# Patient Record
Sex: FEMALE | Race: WHITE | NOT HISPANIC OR LATINO
[De-identification: names, ages, dates, MRNs, and addresses within clinical notes are randomized per-mention and may not be internally consistent; named-entity substitution may affect disease eponyms.]

---

## 2017-01-09 ENCOUNTER — RX RENEWAL (OUTPATIENT)
Age: 82
End: 2017-01-09

## 2017-01-10 ENCOUNTER — APPOINTMENT (OUTPATIENT)
Dept: CARDIOLOGY | Facility: CLINIC | Age: 82
End: 2017-01-10

## 2017-01-10 ENCOUNTER — NON-APPOINTMENT (OUTPATIENT)
Age: 82
End: 2017-01-10

## 2017-01-10 VITALS
BODY MASS INDEX: 34.38 KG/M2 | SYSTOLIC BLOOD PRESSURE: 123 MMHG | OXYGEN SATURATION: 94 % | WEIGHT: 194 LBS | HEIGHT: 63 IN | DIASTOLIC BLOOD PRESSURE: 65 MMHG | HEART RATE: 68 BPM | TEMPERATURE: 98.4 F

## 2017-01-10 VITALS — OXYGEN SATURATION: 98 %

## 2017-01-10 DIAGNOSIS — R07.81 PLEURODYNIA: ICD-10-CM

## 2017-01-11 LAB
ALBUMIN SERPL ELPH-MCNC: 4.1 G/DL
ALP BLD-CCNC: 95 U/L
ALT SERPL-CCNC: 11 U/L
ANION GAP SERPL CALC-SCNC: 14 MMOL/L
AST SERPL-CCNC: 15 U/L
BASOPHILS # BLD AUTO: 0.02 K/UL
BASOPHILS NFR BLD AUTO: 0.3 %
BILIRUB SERPL-MCNC: 0.4 MG/DL
BUN SERPL-MCNC: 19 MG/DL
CALCIUM SERPL-MCNC: 9.4 MG/DL
CHLORIDE SERPL-SCNC: 95 MMOL/L
CHOLEST SERPL-MCNC: 136 MG/DL
CHOLEST/HDLC SERPL: 3.3 RATIO
CO2 SERPL-SCNC: 26 MMOL/L
CREAT SERPL-MCNC: 0.84 MG/DL
EOSINOPHIL # BLD AUTO: 0.03 K/UL
EOSINOPHIL NFR BLD AUTO: 0.5 %
ERYTHROCYTE [SEDIMENTATION RATE] IN BLOOD BY WESTERGREN METHOD: 57 MM/HR
GLUCOSE SERPL-MCNC: 112 MG/DL
HBA1C MFR BLD HPLC: 6.5 %
HCT VFR BLD CALC: 35.3 %
HDLC SERPL-MCNC: 41 MG/DL
HGB BLD-MCNC: 11 G/DL
IMM GRANULOCYTES NFR BLD AUTO: 0.2 %
LDLC SERPL CALC-MCNC: 81 MG/DL
LYMPHOCYTES # BLD AUTO: 1.47 K/UL
LYMPHOCYTES NFR BLD AUTO: 24.9 %
MAN DIFF?: NORMAL
MCHC RBC-ENTMCNC: 27 PG
MCHC RBC-ENTMCNC: 31.2 GM/DL
MCV RBC AUTO: 86.5 FL
MONOCYTES # BLD AUTO: 0.58 K/UL
MONOCYTES NFR BLD AUTO: 9.8 %
NEUTROPHILS # BLD AUTO: 3.8 K/UL
NEUTROPHILS NFR BLD AUTO: 64.3 %
PLATELET # BLD AUTO: 273 K/UL
POTASSIUM SERPL-SCNC: 4.6 MMOL/L
PROT SERPL-MCNC: 6.9 G/DL
RBC # BLD: 4.08 M/UL
RBC # FLD: 14.5 %
SODIUM SERPL-SCNC: 135 MMOL/L
TRIGL SERPL-MCNC: 72 MG/DL
VIT B12 SERPL-MCNC: 304 PG/ML
WBC # FLD AUTO: 5.91 K/UL

## 2017-05-08 ENCOUNTER — RX RENEWAL (OUTPATIENT)
Age: 82
End: 2017-05-08

## 2017-05-15 ENCOUNTER — APPOINTMENT (OUTPATIENT)
Dept: SURGICAL ONCOLOGY | Facility: CLINIC | Age: 82
End: 2017-05-15

## 2017-05-15 VITALS — HEART RATE: 68 BPM | BODY MASS INDEX: 33.66 KG/M2 | OXYGEN SATURATION: 100 % | WEIGHT: 190 LBS | HEIGHT: 63 IN

## 2017-05-15 DIAGNOSIS — Z97.3 PRESENCE OF SPECTACLES AND CONTACT LENSES: ICD-10-CM

## 2017-05-15 RX ORDER — CRANBERRY FRUIT EXTRACT 200 MG
CAPSULE ORAL
Refills: 0 | Status: ACTIVE | COMMUNITY

## 2017-05-15 RX ORDER — MULTIVIT-MIN/IRON/FOLIC ACID/K 18-600-40
CAPSULE ORAL
Refills: 0 | Status: ACTIVE | COMMUNITY

## 2017-05-16 ENCOUNTER — APPOINTMENT (OUTPATIENT)
Dept: CARDIOLOGY | Facility: CLINIC | Age: 82
End: 2017-05-16

## 2017-05-16 ENCOUNTER — NON-APPOINTMENT (OUTPATIENT)
Age: 82
End: 2017-05-16

## 2017-05-16 VITALS
WEIGHT: 199 LBS | HEIGHT: 63 IN | HEART RATE: 71 BPM | BODY MASS INDEX: 35.26 KG/M2 | SYSTOLIC BLOOD PRESSURE: 136 MMHG | OXYGEN SATURATION: 97 % | DIASTOLIC BLOOD PRESSURE: 72 MMHG

## 2017-05-16 DIAGNOSIS — D64.9 ANEMIA, UNSPECIFIED: ICD-10-CM

## 2017-05-17 LAB
ANION GAP SERPL CALC-SCNC: 15 MMOL/L
BASOPHILS # BLD AUTO: 0.02 K/UL
BASOPHILS NFR BLD AUTO: 0.4 %
BUN SERPL-MCNC: 18 MG/DL
CALCIUM SERPL-MCNC: 9.5 MG/DL
CHLORIDE SERPL-SCNC: 100 MMOL/L
CHOLEST SERPL-MCNC: 142 MG/DL
CHOLEST/HDLC SERPL: 2.7 RATIO
CK SERPL-CCNC: 70 U/L
CO2 SERPL-SCNC: 23 MMOL/L
CREAT SERPL-MCNC: 0.78 MG/DL
EOSINOPHIL # BLD AUTO: 0.06 K/UL
EOSINOPHIL NFR BLD AUTO: 1.2 %
GLUCOSE SERPL-MCNC: 95 MG/DL
HBA1C MFR BLD HPLC: 6.1 %
HCT VFR BLD CALC: 35 %
HDLC SERPL-MCNC: 52 MG/DL
HGB BLD-MCNC: 10.9 G/DL
IMM GRANULOCYTES NFR BLD AUTO: 0.2 %
LDLC SERPL CALC-MCNC: 74 MG/DL
LYMPHOCYTES # BLD AUTO: 1.66 K/UL
LYMPHOCYTES NFR BLD AUTO: 32.7 %
MAN DIFF?: NORMAL
MCHC RBC-ENTMCNC: 26.6 PG
MCHC RBC-ENTMCNC: 31.1 GM/DL
MCV RBC AUTO: 85.4 FL
MONOCYTES # BLD AUTO: 0.53 K/UL
MONOCYTES NFR BLD AUTO: 10.4 %
NEUTROPHILS # BLD AUTO: 2.8 K/UL
NEUTROPHILS NFR BLD AUTO: 55.1 %
NT-PROBNP SERPL-MCNC: 193 PG/ML
PLATELET # BLD AUTO: 226 K/UL
POTASSIUM SERPL-SCNC: 4.6 MMOL/L
RBC # BLD: 4.1 M/UL
RBC # FLD: 16.1 %
SODIUM SERPL-SCNC: 138 MMOL/L
TRIGL SERPL-MCNC: 80 MG/DL
TSH SERPL-ACNC: 2.17 UIU/ML
VIT B12 SERPL-MCNC: 329 PG/ML
WBC # FLD AUTO: 5.08 K/UL

## 2017-08-07 ENCOUNTER — RX RENEWAL (OUTPATIENT)
Age: 82
End: 2017-08-07

## 2017-08-10 ENCOUNTER — RX RENEWAL (OUTPATIENT)
Age: 82
End: 2017-08-10

## 2017-09-12 ENCOUNTER — APPOINTMENT (OUTPATIENT)
Dept: CARDIOLOGY | Facility: CLINIC | Age: 82
End: 2017-09-12
Payer: MEDICARE

## 2017-09-12 ENCOUNTER — NON-APPOINTMENT (OUTPATIENT)
Age: 82
End: 2017-09-12

## 2017-09-12 VITALS
SYSTOLIC BLOOD PRESSURE: 111 MMHG | WEIGHT: 202 LBS | DIASTOLIC BLOOD PRESSURE: 69 MMHG | OXYGEN SATURATION: 96 % | HEART RATE: 63 BPM | TEMPERATURE: 97.6 F | BODY MASS INDEX: 35.79 KG/M2 | HEIGHT: 63 IN

## 2017-09-12 PROCEDURE — 36415 COLL VENOUS BLD VENIPUNCTURE: CPT

## 2017-09-12 PROCEDURE — 93000 ELECTROCARDIOGRAM COMPLETE: CPT

## 2017-09-12 PROCEDURE — 99214 OFFICE O/P EST MOD 30 MIN: CPT

## 2017-09-13 LAB
ALBUMIN SERPL ELPH-MCNC: 4 G/DL
ALP BLD-CCNC: 71 U/L
ALT SERPL-CCNC: 9 U/L
ANION GAP SERPL CALC-SCNC: 16 MMOL/L
AST SERPL-CCNC: 17 U/L
BASOPHILS # BLD AUTO: 0.02 K/UL
BASOPHILS NFR BLD AUTO: 0.4 %
BILIRUB SERPL-MCNC: 0.3 MG/DL
BUN SERPL-MCNC: 25 MG/DL
CALCIUM SERPL-MCNC: 10 MG/DL
CHLORIDE SERPL-SCNC: 93 MMOL/L
CHOLEST SERPL-MCNC: 142 MG/DL
CHOLEST/HDLC SERPL: 3.1 RATIO
CO2 SERPL-SCNC: 23 MMOL/L
CREAT SERPL-MCNC: 0.78 MG/DL
EOSINOPHIL # BLD AUTO: 0.07 K/UL
EOSINOPHIL NFR BLD AUTO: 1.5 %
GLUCOSE SERPL-MCNC: 94 MG/DL
HBA1C MFR BLD HPLC: 6 %
HCT VFR BLD CALC: 35 %
HDLC SERPL-MCNC: 46 MG/DL
HGB BLD-MCNC: 11.1 G/DL
IMM GRANULOCYTES NFR BLD AUTO: 0 %
LDLC SERPL CALC-MCNC: 76 MG/DL
LYMPHOCYTES # BLD AUTO: 1.68 K/UL
LYMPHOCYTES NFR BLD AUTO: 37.2 %
MAN DIFF?: NORMAL
MCHC RBC-ENTMCNC: 26.1 PG
MCHC RBC-ENTMCNC: 31.7 GM/DL
MCV RBC AUTO: 82.2 FL
MONOCYTES # BLD AUTO: 0.49 K/UL
MONOCYTES NFR BLD AUTO: 10.8 %
NEUTROPHILS # BLD AUTO: 2.26 K/UL
NEUTROPHILS NFR BLD AUTO: 50.1 %
NT-PROBNP SERPL-MCNC: 180 PG/ML
PLATELET # BLD AUTO: 244 K/UL
POTASSIUM SERPL-SCNC: 4.2 MMOL/L
PROT SERPL-MCNC: 7.1 G/DL
RBC # BLD: 4.26 M/UL
RBC # FLD: 15.3 %
SODIUM SERPL-SCNC: 132 MMOL/L
TRIGL SERPL-MCNC: 98 MG/DL
WBC # FLD AUTO: 4.52 K/UL

## 2017-09-25 ENCOUNTER — APPOINTMENT (OUTPATIENT)
Dept: SURGICAL ONCOLOGY | Facility: CLINIC | Age: 82
End: 2017-09-25
Payer: MEDICARE

## 2017-09-25 VITALS
BODY MASS INDEX: 35.44 KG/M2 | SYSTOLIC BLOOD PRESSURE: 113 MMHG | RESPIRATION RATE: 15 BRPM | WEIGHT: 200 LBS | DIASTOLIC BLOOD PRESSURE: 66 MMHG | OXYGEN SATURATION: 95 % | HEART RATE: 68 BPM | HEIGHT: 63 IN

## 2017-09-25 PROCEDURE — 99214 OFFICE O/P EST MOD 30 MIN: CPT

## 2017-10-02 ENCOUNTER — APPOINTMENT (OUTPATIENT)
Dept: SURGICAL ONCOLOGY | Facility: CLINIC | Age: 82
End: 2017-10-02

## 2017-11-08 ENCOUNTER — RX RENEWAL (OUTPATIENT)
Age: 82
End: 2017-11-08

## 2017-11-17 ENCOUNTER — RX RENEWAL (OUTPATIENT)
Age: 82
End: 2017-11-17

## 2017-12-04 ENCOUNTER — RX RENEWAL (OUTPATIENT)
Age: 82
End: 2017-12-04

## 2017-12-08 ENCOUNTER — CHART COPY (OUTPATIENT)
Age: 82
End: 2017-12-08

## 2018-01-09 ENCOUNTER — NON-APPOINTMENT (OUTPATIENT)
Age: 83
End: 2018-01-09

## 2018-01-09 ENCOUNTER — APPOINTMENT (OUTPATIENT)
Dept: CARDIOLOGY | Facility: CLINIC | Age: 83
End: 2018-01-09
Payer: MEDICARE

## 2018-01-09 VITALS
SYSTOLIC BLOOD PRESSURE: 143 MMHG | WEIGHT: 198 LBS | DIASTOLIC BLOOD PRESSURE: 77 MMHG | HEIGHT: 63 IN | HEART RATE: 57 BPM | BODY MASS INDEX: 35.08 KG/M2 | OXYGEN SATURATION: 96 %

## 2018-01-09 VITALS — DIASTOLIC BLOOD PRESSURE: 70 MMHG | HEART RATE: 60 BPM | SYSTOLIC BLOOD PRESSURE: 124 MMHG

## 2018-01-09 PROCEDURE — 93000 ELECTROCARDIOGRAM COMPLETE: CPT

## 2018-01-09 PROCEDURE — 36415 COLL VENOUS BLD VENIPUNCTURE: CPT

## 2018-01-09 PROCEDURE — 99214 OFFICE O/P EST MOD 30 MIN: CPT

## 2018-01-10 ENCOUNTER — NON-APPOINTMENT (OUTPATIENT)
Age: 83
End: 2018-01-10

## 2018-01-10 LAB
ALBUMIN SERPL ELPH-MCNC: 4.2 G/DL
ALP BLD-CCNC: 80 U/L
ALT SERPL-CCNC: 10 U/L
ANION GAP SERPL CALC-SCNC: 14 MMOL/L
AST SERPL-CCNC: 17 U/L
BASOPHILS # BLD AUTO: 0.03 K/UL
BASOPHILS NFR BLD AUTO: 0.5 %
BILIRUB SERPL-MCNC: 0.3 MG/DL
BUN SERPL-MCNC: 20 MG/DL
CALCIUM SERPL-MCNC: 9.9 MG/DL
CHLORIDE SERPL-SCNC: 97 MMOL/L
CHOLEST SERPL-MCNC: 142 MG/DL
CHOLEST/HDLC SERPL: 3 RATIO
CO2 SERPL-SCNC: 26 MMOL/L
CREAT SERPL-MCNC: 0.89 MG/DL
EOSINOPHIL # BLD AUTO: 0.05 K/UL
EOSINOPHIL NFR BLD AUTO: 0.8 %
GLUCOSE SERPL-MCNC: 90 MG/DL
HBA1C MFR BLD HPLC: 6 %
HCT VFR BLD CALC: 36.7 %
HDLC SERPL-MCNC: 47 MG/DL
HGB BLD-MCNC: 11.4 G/DL
IMM GRANULOCYTES NFR BLD AUTO: 0.2 %
LDLC SERPL CALC-MCNC: 75 MG/DL
LYMPHOCYTES # BLD AUTO: 1.53 K/UL
LYMPHOCYTES NFR BLD AUTO: 25.8 %
MAN DIFF?: NORMAL
MCHC RBC-ENTMCNC: 27 PG
MCHC RBC-ENTMCNC: 31.1 GM/DL
MCV RBC AUTO: 86.8 FL
MONOCYTES # BLD AUTO: 0.61 K/UL
MONOCYTES NFR BLD AUTO: 10.3 %
NEUTROPHILS # BLD AUTO: 3.7 K/UL
NEUTROPHILS NFR BLD AUTO: 62.4 %
NT-PROBNP SERPL-MCNC: 262 PG/ML
PLATELET # BLD AUTO: 245 K/UL
POTASSIUM SERPL-SCNC: 4.7 MMOL/L
PROT SERPL-MCNC: 6.7 G/DL
RBC # BLD: 4.23 M/UL
RBC # FLD: 15.8 %
SODIUM SERPL-SCNC: 137 MMOL/L
TRIGL SERPL-MCNC: 102 MG/DL
WBC # FLD AUTO: 5.93 K/UL

## 2018-01-11 LAB — TSH SERPL-ACNC: 3.41 UIU/ML

## 2018-02-05 ENCOUNTER — RX RENEWAL (OUTPATIENT)
Age: 83
End: 2018-02-05

## 2018-02-08 ENCOUNTER — RX RENEWAL (OUTPATIENT)
Age: 83
End: 2018-02-08

## 2018-04-02 ENCOUNTER — APPOINTMENT (OUTPATIENT)
Dept: SURGICAL ONCOLOGY | Facility: CLINIC | Age: 83
End: 2018-04-02
Payer: MEDICARE

## 2018-05-07 ENCOUNTER — APPOINTMENT (OUTPATIENT)
Dept: SURGICAL ONCOLOGY | Facility: CLINIC | Age: 83
End: 2018-05-07
Payer: MEDICARE

## 2018-05-07 VITALS
DIASTOLIC BLOOD PRESSURE: 72 MMHG | SYSTOLIC BLOOD PRESSURE: 136 MMHG | HEART RATE: 62 BPM | WEIGHT: 200 LBS | HEIGHT: 63 IN | BODY MASS INDEX: 35.44 KG/M2 | RESPIRATION RATE: 17 BRPM

## 2018-05-07 PROCEDURE — 99214 OFFICE O/P EST MOD 30 MIN: CPT

## 2018-05-08 ENCOUNTER — NON-APPOINTMENT (OUTPATIENT)
Age: 83
End: 2018-05-08

## 2018-05-08 ENCOUNTER — APPOINTMENT (OUTPATIENT)
Dept: CARDIOLOGY | Facility: CLINIC | Age: 83
End: 2018-05-08
Payer: MEDICARE

## 2018-05-08 VITALS
BODY MASS INDEX: 36.14 KG/M2 | WEIGHT: 204 LBS | DIASTOLIC BLOOD PRESSURE: 69 MMHG | HEART RATE: 58 BPM | SYSTOLIC BLOOD PRESSURE: 119 MMHG | HEIGHT: 63 IN | OXYGEN SATURATION: 96 % | TEMPERATURE: 98.2 F

## 2018-05-08 DIAGNOSIS — E53.8 DEFICIENCY OF OTHER SPECIFIED B GROUP VITAMINS: ICD-10-CM

## 2018-05-08 DIAGNOSIS — E87.1 HYPO-OSMOLALITY AND HYPONATREMIA: ICD-10-CM

## 2018-05-08 PROCEDURE — 93000 ELECTROCARDIOGRAM COMPLETE: CPT

## 2018-05-08 PROCEDURE — 99214 OFFICE O/P EST MOD 30 MIN: CPT

## 2018-05-08 PROCEDURE — 36415 COLL VENOUS BLD VENIPUNCTURE: CPT

## 2018-05-10 LAB
ALBUMIN SERPL ELPH-MCNC: 4 G/DL
ALP BLD-CCNC: 73 U/L
ALT SERPL-CCNC: 10 U/L
ANION GAP SERPL CALC-SCNC: 16 MMOL/L
AST SERPL-CCNC: 15 U/L
BASOPHILS # BLD AUTO: 0.02 K/UL
BASOPHILS NFR BLD AUTO: 0.3 %
BILIRUB SERPL-MCNC: 0.4 MG/DL
BUN SERPL-MCNC: 23 MG/DL
CALCIUM SERPL-MCNC: 9.9 MG/DL
CHLORIDE SERPL-SCNC: 96 MMOL/L
CHOLEST SERPL-MCNC: 138 MG/DL
CHOLEST/HDLC SERPL: 2.9 RATIO
CO2 SERPL-SCNC: 24 MMOL/L
CREAT SERPL-MCNC: 0.86 MG/DL
EOSINOPHIL # BLD AUTO: 0.05 K/UL
EOSINOPHIL NFR BLD AUTO: 0.9 %
GLUCOSE SERPL-MCNC: 94 MG/DL
HBA1C MFR BLD HPLC: 6.4 %
HCT VFR BLD CALC: 35.4 %
HDLC SERPL-MCNC: 47 MG/DL
HGB BLD-MCNC: 11.3 G/DL
IMM GRANULOCYTES NFR BLD AUTO: 0.7 %
LDLC SERPL CALC-MCNC: 74 MG/DL
LYMPHOCYTES # BLD AUTO: 1.65 K/UL
LYMPHOCYTES NFR BLD AUTO: 28.6 %
MAN DIFF?: NORMAL
MCHC RBC-ENTMCNC: 26.9 PG
MCHC RBC-ENTMCNC: 31.9 GM/DL
MCV RBC AUTO: 84.3 FL
MONOCYTES # BLD AUTO: 0.58 K/UL
MONOCYTES NFR BLD AUTO: 10.1 %
NEUTROPHILS # BLD AUTO: 3.43 K/UL
NEUTROPHILS NFR BLD AUTO: 59.4 %
NT-PROBNP SERPL-MCNC: 216 PG/ML
PLATELET # BLD AUTO: 233 K/UL
POTASSIUM SERPL-SCNC: 4.5 MMOL/L
PROT SERPL-MCNC: 6.6 G/DL
RBC # BLD: 4.2 M/UL
RBC # FLD: 15.5 %
SODIUM SERPL-SCNC: 136 MMOL/L
TRIGL SERPL-MCNC: 87 MG/DL
WBC # FLD AUTO: 5.77 K/UL

## 2018-05-11 LAB
TSH SERPL-ACNC: 3.22 UIU/ML
VIT B12 SERPL-MCNC: 709 PG/ML

## 2018-05-13 ENCOUNTER — RX RENEWAL (OUTPATIENT)
Age: 83
End: 2018-05-13

## 2018-05-14 ENCOUNTER — APPOINTMENT (OUTPATIENT)
Dept: CARDIOLOGY | Facility: CLINIC | Age: 83
End: 2018-05-14
Payer: MEDICARE

## 2018-05-14 PROCEDURE — 93306 TTE W/DOPPLER COMPLETE: CPT

## 2018-08-15 ENCOUNTER — RX RENEWAL (OUTPATIENT)
Age: 83
End: 2018-08-15

## 2018-09-03 ENCOUNTER — RX RENEWAL (OUTPATIENT)
Age: 83
End: 2018-09-03

## 2018-09-13 ENCOUNTER — APPOINTMENT (OUTPATIENT)
Dept: CARDIOLOGY | Facility: CLINIC | Age: 83
End: 2018-09-13
Payer: MEDICARE

## 2018-09-13 ENCOUNTER — NON-APPOINTMENT (OUTPATIENT)
Age: 83
End: 2018-09-13

## 2018-09-13 VITALS
BODY MASS INDEX: 36.32 KG/M2 | SYSTOLIC BLOOD PRESSURE: 116 MMHG | HEART RATE: 68 BPM | OXYGEN SATURATION: 95 % | DIASTOLIC BLOOD PRESSURE: 76 MMHG | HEIGHT: 63 IN | TEMPERATURE: 98.1 F | WEIGHT: 205 LBS

## 2018-09-13 PROCEDURE — 93000 ELECTROCARDIOGRAM COMPLETE: CPT

## 2018-09-13 PROCEDURE — 36415 COLL VENOUS BLD VENIPUNCTURE: CPT

## 2018-09-13 PROCEDURE — 99214 OFFICE O/P EST MOD 30 MIN: CPT

## 2018-09-14 LAB
ALBUMIN SERPL ELPH-MCNC: 4 G/DL
ALP BLD-CCNC: 75 U/L
ALT SERPL-CCNC: 9 U/L
ANION GAP SERPL CALC-SCNC: 15 MMOL/L
AST SERPL-CCNC: 16 U/L
BASOPHILS # BLD AUTO: 0.02 K/UL
BASOPHILS NFR BLD AUTO: 0.4 %
BILIRUB SERPL-MCNC: 0.3 MG/DL
BUN SERPL-MCNC: 21 MG/DL
CALCIUM SERPL-MCNC: 9.9 MG/DL
CHLORIDE SERPL-SCNC: 97 MMOL/L
CHOLEST SERPL-MCNC: 130 MG/DL
CHOLEST/HDLC SERPL: 3.1 RATIO
CO2 SERPL-SCNC: 23 MMOL/L
CREAT SERPL-MCNC: 0.8 MG/DL
EOSINOPHIL # BLD AUTO: 0.05 K/UL
EOSINOPHIL NFR BLD AUTO: 1 %
GLUCOSE SERPL-MCNC: 94 MG/DL
HBA1C MFR BLD HPLC: 6.4 %
HCT VFR BLD CALC: 35.4 %
HDLC SERPL-MCNC: 42 MG/DL
HGB BLD-MCNC: 11.6 G/DL
IMM GRANULOCYTES NFR BLD AUTO: 0.4 %
LDLC SERPL CALC-MCNC: 62 MG/DL
LYMPHOCYTES # BLD AUTO: 1.51 K/UL
LYMPHOCYTES NFR BLD AUTO: 29.8 %
MAN DIFF?: NORMAL
MCHC RBC-ENTMCNC: 27.2 PG
MCHC RBC-ENTMCNC: 32.8 GM/DL
MCV RBC AUTO: 82.9 FL
MONOCYTES # BLD AUTO: 0.53 K/UL
MONOCYTES NFR BLD AUTO: 10.5 %
NEUTROPHILS # BLD AUTO: 2.94 K/UL
NEUTROPHILS NFR BLD AUTO: 57.9 %
NT-PROBNP SERPL-MCNC: 184 PG/ML
PLATELET # BLD AUTO: 211 K/UL
POTASSIUM SERPL-SCNC: 4.2 MMOL/L
PROT SERPL-MCNC: 7 G/DL
RBC # BLD: 4.27 M/UL
RBC # FLD: 16 %
SODIUM SERPL-SCNC: 135 MMOL/L
TRIGL SERPL-MCNC: 129 MG/DL
TSH SERPL-ACNC: 2.91 UIU/ML
VIT B12 SERPL-MCNC: 370 PG/ML
WBC # FLD AUTO: 5.07 K/UL

## 2018-10-19 ENCOUNTER — CLINICAL ADVICE (OUTPATIENT)
Age: 83
End: 2018-10-19

## 2018-10-24 ENCOUNTER — CLINICAL ADVICE (OUTPATIENT)
Age: 83
End: 2018-10-24

## 2018-10-29 ENCOUNTER — APPOINTMENT (OUTPATIENT)
Dept: CARDIOLOGY | Facility: CLINIC | Age: 83
End: 2018-10-29
Payer: MEDICARE

## 2018-10-29 ENCOUNTER — NON-APPOINTMENT (OUTPATIENT)
Age: 83
End: 2018-10-29

## 2018-10-29 ENCOUNTER — MEDICATION RENEWAL (OUTPATIENT)
Age: 83
End: 2018-10-29

## 2018-10-29 VITALS
OXYGEN SATURATION: 94 % | DIASTOLIC BLOOD PRESSURE: 85 MMHG | HEART RATE: 99 BPM | HEIGHT: 63 IN | BODY MASS INDEX: 35.61 KG/M2 | TEMPERATURE: 98.1 F | WEIGHT: 201 LBS | SYSTOLIC BLOOD PRESSURE: 133 MMHG

## 2018-10-29 VITALS — SYSTOLIC BLOOD PRESSURE: 138 MMHG | HEART RATE: 84 BPM | DIASTOLIC BLOOD PRESSURE: 70 MMHG

## 2018-10-29 PROCEDURE — 93000 ELECTROCARDIOGRAM COMPLETE: CPT

## 2018-10-29 PROCEDURE — 99215 OFFICE O/P EST HI 40 MIN: CPT

## 2018-11-05 ENCOUNTER — APPOINTMENT (OUTPATIENT)
Dept: SURGICAL ONCOLOGY | Facility: CLINIC | Age: 83
End: 2018-11-05
Payer: MEDICARE

## 2018-11-05 VITALS
DIASTOLIC BLOOD PRESSURE: 71 MMHG | WEIGHT: 201 LBS | HEART RATE: 53 BPM | RESPIRATION RATE: 18 BRPM | SYSTOLIC BLOOD PRESSURE: 141 MMHG | HEIGHT: 63 IN | BODY MASS INDEX: 35.61 KG/M2

## 2018-11-05 PROCEDURE — 99214 OFFICE O/P EST MOD 30 MIN: CPT

## 2018-11-08 ENCOUNTER — MEDICATION RENEWAL (OUTPATIENT)
Age: 83
End: 2018-11-08

## 2018-11-15 ENCOUNTER — RX RENEWAL (OUTPATIENT)
Age: 83
End: 2018-11-15

## 2018-11-19 ENCOUNTER — NON-APPOINTMENT (OUTPATIENT)
Age: 83
End: 2018-11-19

## 2018-11-19 ENCOUNTER — APPOINTMENT (OUTPATIENT)
Dept: CARDIOLOGY | Facility: CLINIC | Age: 83
End: 2018-11-19
Payer: MEDICARE

## 2018-11-19 VITALS
TEMPERATURE: 98.1 F | DIASTOLIC BLOOD PRESSURE: 73 MMHG | OXYGEN SATURATION: 97 % | BODY MASS INDEX: 34.91 KG/M2 | SYSTOLIC BLOOD PRESSURE: 117 MMHG | HEIGHT: 63 IN | HEART RATE: 62 BPM | WEIGHT: 197 LBS

## 2018-11-19 DIAGNOSIS — K52.9 NONINFECTIVE GASTROENTERITIS AND COLITIS, UNSPECIFIED: ICD-10-CM

## 2018-11-19 PROCEDURE — 93000 ELECTROCARDIOGRAM COMPLETE: CPT

## 2018-11-19 PROCEDURE — 36415 COLL VENOUS BLD VENIPUNCTURE: CPT

## 2018-11-19 PROCEDURE — 99215 OFFICE O/P EST HI 40 MIN: CPT

## 2018-11-19 NOTE — REVIEW OF SYSTEMS
[Palpitations] : palpitations [Abdominal Pain] : abdominal pain [Joint Pain] : joint pain [Joint Stiffness] : joint stiffness [see HPI] : see HPI [Numbness (Hypesthesia)] : numbness [Under Stress] : under stress [Negative] : Heme/Lymph [Recent Weight Gain (___ Lbs)] : no recent weight gain [Feeling Fatigued] : not feeling fatigued [Recent Weight Loss (___ Lbs)] : no recent weight loss [Shortness Of Breath] : no shortness of breath [Dyspnea on exertion] : not dyspnea during exertion [Chest  Pressure] : no chest pressure [Chest Pain] : no chest pain [Lower Ext Edema] : no extremity edema [Leg Claudication] : no intermittent leg claudication [Heartburn] : no heartburn [Change In The Stool] : no change in stool [Dizziness] : no dizziness

## 2018-11-19 NOTE — HISTORY OF PRESENT ILLNESS
[FreeTextEntry1] : (MED HX) Patient with known coronary disease with stents to her circumflex artery and obtuse marginal going back to 2006 and 2008. Normal stress thallium in 2011 before surgery. Symptoms of esophageal reflux on and off that have been different than her angina. However in August, 2012 she had a return of her usual angina which initially responded to bisoprolol but then her symptoms increased and the patient called me on September 7. She was scheduled for a cardiac catheterization on September 10 and found to have a subtotal occlusion of her circumflex. This was treated with a drug-eluting stent and she was sent home on the 11th on aspirin and Plavix and she came  for followup 9/28/12. Since being home she has not had any of those same symptoms and is able to walk up the stairs now without shortness of breath or chest pain.\par She was here in January, 2013 for clearance for cataract surgery. She had no complications and was doing well since then other than having trouble maintaining her weight as she goes to [employer] and her  is able to eat cake and other foods without gaining any weight. She denies chest pain or shortness of breath with exertion, although when she lies down in bed she says she feels short of breath briefly.\par August 19, 2013. Here for followup and has no real complaints. She did have some pains in the legs that have been getting better with physical therapy. She denies exertional chest pain or shortness of breath. She is still having trouble losing weight. All seemed acceptable. She had labs with Dr. Do in October. BUN was slightly elevated and the sodium was 131 so he was asking me about cutting back on the diuretic. In addition she was anemic with an MCV of 80.\par January 15, 2014. The patient is here for followup. Her main issue is that she may need carpal tunnel surgery on both sides. She has some atypical pain in the upper left chest and neck area that is nonexertional and sounds more arthritic or musculoskeletal. She denies chest pain or shortness of breath. She was pleased to find out that based on the scale here she has lost 9 pounds.\par Tatiana 10, 2014. The patient is here for followup. She didn't need the carpal tunnel surgery but instead responded well to a steroid injection. However she developed bursitis in her right shoulder and had a steroid shot that just lasted for about a month. Now she is finishing up with physical therapy. Her main complaint is that she just cannot walk like she used to; she just kind of hits a wall and has to slow down. She denies exertional chest pain or shortness of breath and no definite leg pain but thinks it could be her arthritis. She claims her weight is about the same. She has had no change in her medications recently.\par The patient saw Dr. Geronimo in September for preop evaluation and she underwent carpal tunnel surgery on October 2. No complications and she saw Dr. Jaylan Christianson in followup.\par November 14, 2014. The patient is here in followup. When she lies down at night she gets pressure in her chest which feels similar to her previous symptoms and she was worried she might need another stent however she tries to see what happens when she walks up and down the stairs and has absolutely no symptoms whatsoever. Upon further questioning it seems that she was told to go back on the aspirin in addition to the Plavix by the presurgical testing people before the carpal tunnel surgery and it is only since being back on the aspirin that she has these symptoms. There is no shortness of breath. She is having trouble losing weight. I felt fairly certain that her symptoms were reflux and related to the aspirin. The aspirin was stopped and the AcipHex was increased to b.i.d.\par March 23, 2015. Patient is here for followup. On the whole she has been doing well since her last visit without recurrence of her chest pain or shortness of breath. However about 2-3 months ago she had a syncopal episode when she got up from the table and before she realized it she was on the floor. When she came to she was a little shaky but otherwise was okay. No recurrence. The next day she was in pain in her joints and was worried about her knee so  she was checked out by orthopedics and everything was fine. She has had no orthostatic symptoms since then and no lightheadedness or near syncope since then. She is on a diuretic every other day and she does not recall being ill around that time or any reason why she might have been dehydrated. She is on 5 mg of bisoprolol but has had no other episodes of syncope or near syncope in the last 2 months. Workup was unremarkable\par July 29, 2015. The patient is here in followup. She is complaining that her legs and arms feel heavy as if she's walking in sand or water. She is borderline hypotensive and it could just be because of the heat and volume depletion but the patient has not lost any weight and she claims that the symptoms have gone on for a few months not just recently when it's been hot. She denies chest pain or exertional shortness of breath. She has been having some trouble sleeping as well. Her EKG is totally within normal limits. I elected to hold her valsartan and then check labs for kidney function looking for volume depletion as to whether we should cut back on the diuretic more than just every other day. The possibility includes whether these are side effects of statin. No recurrence of her syncope. \par November 12, 2015. The patient is here for followup. She is now off Diovan because of her orthostatic symptoms and these seemed to have improved. She has no specific complaints. She is asking about aspirin versus Plavix, and she has remained just on Plavix most of this time. She does have esophageal reflux and is under treatment and under control. She saw Dr. Olmos in followup for her breast cancer and everything is fine and she is to see him again in 6 months. She denies exertional chest pain or shortness of breath. Her weight is still an issue.\par March 9, 2016. The patient is here for followup. Has been under a fair amount of stress as well as increased physical activity around the house with her 's intracerebral hemorrhage. He is improving greatly which has lessened her stress somewhat. She does not complain of chest pain or shortness of breath. She has an upcoming followup with oncologist and that'll be four years so far since her cancer surgery.\par June 16, 2016. Patient is here for followup with a little stress as her  is doing a little better, but she's been having episodes of trouble with her balance. No vertigo. No lightheadedness, syncope, or near syncope. She can't even feel unbalanced while sitting. Usually can last an hour but can function somewhat. Happens about once a week. Does not seem to be related to food or medication.\par October 13, 2016. Patient is here to follow up with her . They celebrated their 65th wedding anniversary. She is doing about the same, with some stress. Had a workup fpr vertebrobasilar insufficiency. She has numbness in both feet, but not in her hands and she will be having an EMG coming up soon.\par January 10, 2017. Patient here for followup. Not feeling well for about a month or so. Has chest pain and back pain when she takes a deep breath, but not with exertion. Needs laser surgery on her eyes complains about her body from feet to her head etc. Never did start the B12 injections that Dr. Rosenstein wanted. She is worried she might need another stent but again no exertional component.\par May 16, 2017. Patient here in followup. No chest pain or shortness of breath. Tripped on the carpet and got an infection in the knee, where she had her knee replacement, but she was able to treated with oral antibiotics and is just about finished. Her weight is up she thinks. She is very concerned about her , who seems to be a little bit more unbalanced and has a little bit more memory difficulties secondary to his intracerebral hemorrhage surgery and obviously getting older as well. No change in any of her medication.\par September 12, 2017. Patient is here in followup. She has been diagnosed with a peripheral neuropathy and is getting B12 injections. Her legs get numb when she flexes them up towards herself. Meanwhile, she is not walking much, so she is gaining a lot of weight. No chest pain, no shortness of breath, but is not as active as she used to be.\par January 9, 2018. The patient is here in followup. She had a six-month breast cancer followup in September, and everything checked out okay. She denies any increase in exertional chest pain or shortness of breath. There is a fair amount of stress from her 's memory and her 22-year-old granddaughter who is living with them. No change in medications.\par May 8, 2018. Patient here in followup. Doing well except gaining weight, not losing. Hed CAT scan showing coronary artery calcifications, and she was very concerned about the report, but I explained it is a normal finding. At this age and does not necessarily correlate with coronary artery disease. The report also mentioned enlarged heart and mitral valve calcification. No recent echo. Here her blood pressure is excellent. EKG unchanged.\par May 14, 2018. Returned for echocardiogram. Moderate MR. Mildly calcified aortic valve. Mild LAE. Normal LV function with concentric remodeling and mild diastolic dysfunction. Mild SYDNEE, with normal RV size and function. Moderate to severe TR with RVSP 44.\par September 13, 2018. Patient here to followup with her . No real complaints. Still, dyspnea on exertion going up stairs, but no chest pressure, and no change. VPCs on EKG, and on exam. Asymptomatic.\par October 29, 2018. On the 19th patient went to GI for colitis type complaints and was found to be in rapid A. fib. Sent to ER at [facility]. Converted with Cardizem and was started on Xarelto and discharged. I spoke with her on the 24th and arranged to see her today. In the meantime over the weekend she was readmitted to [facility] because of palpitations although not clear that she was in atrial fibrillation. She is here today in sinus rhythm with very frequent APCs. Quadrigeminy. For some reason she stopped her other medications, including her beta blocker her diuretic her statin and Plavix. She is also on metronidazole and Cipro because of her colitis.\par November 19, 2018. Patient is here in followup and for a clearance for a sigmoid biopsy and colonoscopy by Dr. Jonny Connor. The patient remains in sinus rhythm on sotalol. She is asymptomatic. She is somewhat nervous about the anesthesia and procedure. It is scheduled for November 29.She had been on 15 mg of Xarelto from the hospital, but based on her kidney function should be on 20 mg. If she stops the anticoagulation she does need to take baby aspirin because of her cardiac stent

## 2018-11-19 NOTE — REASON FOR VISIT
[FreeTextEntry1] : Followup for patient with cardiac stents and risk factors for CAD, now with paroxysmal atrial fib

## 2018-11-19 NOTE — PHYSICAL EXAM
[General Appearance - Well Developed] : well developed [Normal Appearance] : normal appearance [Well Groomed] : well groomed [General Appearance - Well Nourished] : well nourished [No Deformities] : no deformities [General Appearance - In No Acute Distress] : no acute distress [Normal Conjunctiva] : the conjunctiva exhibited no abnormalities [Eyelids - No Xanthelasma] : the eyelids demonstrated no xanthelasmas [Normal Oral Mucosa] : normal oral mucosa [No Oral Pallor] : no oral pallor [No Oral Cyanosis] : no oral cyanosis [Normal Jugular Venous A Waves Present] : normal jugular venous A waves present [Normal Jugular Venous V Waves Present] : normal jugular venous V waves present [No Jugular Venous Epps A Waves] : no jugular venous epps A waves [Respiration, Rhythm And Depth] : normal respiratory rhythm and effort [Exaggerated Use Of Accessory Muscles For Inspiration] : no accessory muscle use [Auscultation Breath Sounds / Voice Sounds] : lungs were clear to auscultation bilaterally [Heart Rate And Rhythm] : heart rate and rhythm were normal [Heart Sounds] : normal S1 and S2 [Murmurs] : no murmurs present [Abdomen Soft] : soft [Abdomen Tenderness] : non-tender [Abdomen Mass (___ Cm)] : no abdominal mass palpated [Abnormal Walk] : normal gait [Gait - Sufficient For Exercise Testing] : the gait was sufficient for exercise testing [Nail Clubbing] : no clubbing of the fingernails [Cyanosis, Localized] : no localized cyanosis [Petechial Hemorrhages (___cm)] : no petechial hemorrhages [Skin Color & Pigmentation] : normal skin color and pigmentation [] : no rash [No Venous Stasis] : no venous stasis [Skin Lesions] : no skin lesions [No Skin Ulcers] : no skin ulcer [No Xanthoma] : no  xanthoma was observed [Oriented To Time, Place, And Person] : oriented to person, place, and time [Affect] : the affect was normal [Mood] : the mood was normal [No Anxiety] : not feeling anxious [FreeTextEntry1] : No ankle edema (just "fat" ankles). Pedal pulses intact. Pulses symmetric, and no pulse deficits

## 2018-11-19 NOTE — CARDIOLOGY SUMMARY
[No Ischemia] : no Ischemia [___] : [unfilled] [LVEF ___%] : LVEF [unfilled]% [None] : normal LV function [Enlarged] : enlarged LA size [Mild] : mild mitral regurgitation

## 2018-11-20 LAB
ALBUMIN SERPL ELPH-MCNC: 4.1 G/DL
ALP BLD-CCNC: 66 U/L
ALT SERPL-CCNC: 8 U/L
ANION GAP SERPL CALC-SCNC: 14 MMOL/L
AST SERPL-CCNC: 17 U/L
BASOPHILS # BLD AUTO: 0.02 K/UL
BASOPHILS NFR BLD AUTO: 0.4 %
BILIRUB SERPL-MCNC: 0.4 MG/DL
BUN SERPL-MCNC: 19 MG/DL
CALCIUM SERPL-MCNC: 9.7 MG/DL
CHLORIDE SERPL-SCNC: 100 MMOL/L
CO2 SERPL-SCNC: 25 MMOL/L
CREAT SERPL-MCNC: 0.87 MG/DL
EOSINOPHIL # BLD AUTO: 0.06 K/UL
EOSINOPHIL NFR BLD AUTO: 1.2 %
GLUCOSE SERPL-MCNC: 90 MG/DL
HCT VFR BLD CALC: 36.9 %
HGB BLD-MCNC: 11.1 G/DL
IMM GRANULOCYTES NFR BLD AUTO: 0.2 %
LYMPHOCYTES # BLD AUTO: 1.71 K/UL
LYMPHOCYTES NFR BLD AUTO: 33.4 %
MAN DIFF?: NORMAL
MCHC RBC-ENTMCNC: 25.6 PG
MCHC RBC-ENTMCNC: 30.1 GM/DL
MCV RBC AUTO: 85.2 FL
MONOCYTES # BLD AUTO: 0.7 K/UL
MONOCYTES NFR BLD AUTO: 13.7 %
NEUTROPHILS # BLD AUTO: 2.62 K/UL
NEUTROPHILS NFR BLD AUTO: 51.1 %
NT-PROBNP SERPL-MCNC: 192 PG/ML
PLATELET # BLD AUTO: 237 K/UL
POTASSIUM SERPL-SCNC: 4.9 MMOL/L
PROT SERPL-MCNC: 7.1 G/DL
RBC # BLD: 4.33 M/UL
RBC # FLD: 16.2 %
SODIUM SERPL-SCNC: 139 MMOL/L
WBC # FLD AUTO: 5.12 K/UL

## 2018-12-07 ENCOUNTER — RX RENEWAL (OUTPATIENT)
Age: 83
End: 2018-12-07

## 2018-12-07 ENCOUNTER — MEDICATION RENEWAL (OUTPATIENT)
Age: 83
End: 2018-12-07

## 2018-12-07 RX ORDER — DILTIAZEM HYDROCHLORIDE 120 MG/1
120 CAPSULE, EXTENDED RELEASE ORAL DAILY
Qty: 90 | Refills: 1 | Status: ACTIVE | COMMUNITY
Start: 2018-10-29 | End: 1900-01-01

## 2018-12-10 ENCOUNTER — RX RENEWAL (OUTPATIENT)
Age: 83
End: 2018-12-10

## 2019-01-18 ENCOUNTER — APPOINTMENT (OUTPATIENT)
Dept: CARDIOLOGY | Facility: CLINIC | Age: 84
End: 2019-01-18
Payer: MEDICARE

## 2019-01-18 ENCOUNTER — NON-APPOINTMENT (OUTPATIENT)
Age: 84
End: 2019-01-18

## 2019-01-18 VITALS
WEIGHT: 197 LBS | BODY MASS INDEX: 34.91 KG/M2 | DIASTOLIC BLOOD PRESSURE: 65 MMHG | SYSTOLIC BLOOD PRESSURE: 129 MMHG | OXYGEN SATURATION: 96 % | HEART RATE: 66 BPM | HEIGHT: 63 IN

## 2019-01-18 DIAGNOSIS — H35.30 UNSPECIFIED MACULAR DEGENERATION: ICD-10-CM

## 2019-01-18 PROCEDURE — 99214 OFFICE O/P EST MOD 30 MIN: CPT

## 2019-01-18 PROCEDURE — 93000 ELECTROCARDIOGRAM COMPLETE: CPT

## 2019-01-18 PROCEDURE — 36415 COLL VENOUS BLD VENIPUNCTURE: CPT

## 2019-01-18 NOTE — HISTORY OF PRESENT ILLNESS
[FreeTextEntry1] : (MED HX) Patient with known coronary disease with stents to her circumflex artery and obtuse marginal going back to 2006 and 2008. Normal stress thallium in 2011 before surgery. Symptoms of esophageal reflux on and off that have been different than her angina. However in August, 2012 she had a return of her usual angina which initially responded to bisoprolol but then her symptoms increased and the patient called me on September 7. She was scheduled for a cardiac catheterization on September 10 and found to have a subtotal occlusion of her circumflex. This was treated with a drug-eluting stent and she was sent home on the 11th on aspirin and Plavix and she came  for followup 9/28/12. Since being home she has not had any of those same symptoms and is able to walk up the stairs now without shortness of breath or chest pain.\par She was here in January, 2013 for clearance for cataract surgery. She had no complications and was doing well since then other than having trouble maintaining her weight as she goes to [employer] and her  is able to eat cake and other foods without gaining any weight. She denies chest pain or shortness of breath with exertion, although when she lies down in bed she says she feels short of breath briefly.\par August 19, 2013. Here for followup and has no real complaints. She did have some pains in the legs that have been getting better with physical therapy. She denies exertional chest pain or shortness of breath. She is still having trouble losing weight. All seemed acceptable. She had labs with Dr. Do in October. BUN was slightly elevated and the sodium was 131 so he was asking me about cutting back on the diuretic. In addition she was anemic with an MCV of 80.\par January 15, 2014. The patient is here for followup. Her main issue is that she may need carpal tunnel surgery on both sides. She has some atypical pain in the upper left chest and neck area that is nonexertional and sounds more arthritic or musculoskeletal. She denies chest pain or shortness of breath. She was pleased to find out that based on the scale here she has lost 9 pounds.\par Tatiana 10, 2014. The patient is here for followup. She didn't need the carpal tunnel surgery but instead responded well to a steroid injection. However she developed bursitis in her right shoulder and had a steroid shot that just lasted for about a month. Now she is finishing up with physical therapy. Her main complaint is that she just cannot walk like she used to; she just kind of hits a wall and has to slow down. She denies exertional chest pain or shortness of breath and no definite leg pain but thinks it could be her arthritis. She claims her weight is about the same. She has had no change in her medications recently.\par The patient saw Dr. Geronimo in September for preop evaluation and she underwent carpal tunnel surgery on October 2. No complications and she saw Dr. Jaylan Christianson in followup.\par November 14, 2014. The patient is here in followup. When she lies down at night she gets pressure in her chest which feels similar to her previous symptoms and she was worried she might need another stent however she tries to see what happens when she walks up and down the stairs and has absolutely no symptoms whatsoever. Upon further questioning it seems that she was told to go back on the aspirin in addition to the Plavix by the presurgical testing people before the carpal tunnel surgery and it is only since being back on the aspirin that she has these symptoms. There is no shortness of breath. She is having trouble losing weight. I felt fairly certain that her symptoms were reflux and related to the aspirin. The aspirin was stopped and the AcipHex was increased to b.i.d.\par March 23, 2015. Patient is here for followup. On the whole she has been doing well since her last visit without recurrence of her chest pain or shortness of breath. However about 2-3 months ago she had a syncopal episode when she got up from the table and before she realized it she was on the floor. When she came to she was a little shaky but otherwise was okay. No recurrence. The next day she was in pain in her joints and was worried about her knee so  she was checked out by orthopedics and everything was fine. She has had no orthostatic symptoms since then and no lightheadedness or near syncope since then. She is on a diuretic every other day and she does not recall being ill around that time or any reason why she might have been dehydrated. She is on 5 mg of bisoprolol but has had no other episodes of syncope or near syncope in the last 2 months. Workup was unremarkable\par July 29, 2015. The patient is here in followup. She is complaining that her legs and arms feel heavy as if she's walking in sand or water. She is borderline hypotensive and it could just be because of the heat and volume depletion but the patient has not lost any weight and she claims that the symptoms have gone on for a few months not just recently when it's been hot. She denies chest pain or exertional shortness of breath. She has been having some trouble sleeping as well. Her EKG is totally within normal limits. I elected to hold her valsartan and then check labs for kidney function looking for volume depletion as to whether we should cut back on the diuretic more than just every other day. The possibility includes whether these are side effects of statin. No recurrence of her syncope. \par November 12, 2015. The patient is here for followup. She is now off Diovan because of her orthostatic symptoms and these seemed to have improved. She has no specific complaints. She is asking about aspirin versus Plavix, and she has remained just on Plavix most of this time. She does have esophageal reflux and is under treatment and under control. She saw Dr. Olmos in followup for her breast cancer and everything is fine and she is to see him again in 6 months. She denies exertional chest pain or shortness of breath. Her weight is still an issue.\par March 9, 2016. The patient is here for followup. Has been under a fair amount of stress as well as increased physical activity around the house with her 's intracerebral hemorrhage. He is improving greatly which has lessened her stress somewhat. She does not complain of chest pain or shortness of breath. She has an upcoming followup with oncologist and that'll be four years so far since her cancer surgery.\par June 16, 2016. Patient is here for followup with a little stress as her  is doing a little better, but she's been having episodes of trouble with her balance. No vertigo. No lightheadedness, syncope, or near syncope. She can't even feel unbalanced while sitting. Usually can last an hour but can function somewhat. Happens about once a week. Does not seem to be related to food or medication.\par October 13, 2016. Patient is here to follow up with her . They celebrated their 65th wedding anniversary. She is doing about the same, with some stress. Had a workup fpr vertebrobasilar insufficiency. She has numbness in both feet, but not in her hands and she will be having an EMG coming up soon.\par January 10, 2017. Patient here for followup. Not feeling well for about a month or so. Has chest pain and back pain when she takes a deep breath, but not with exertion. Needs laser surgery on her eyes complains about her body from feet to her head etc. Never did start the B12 injections that Dr. Rosenstein wanted. She is worried she might need another stent but again no exertional component.\par May 16, 2017. Patient here in followup. No chest pain or shortness of breath. Tripped on the carpet and got an infection in the knee, where she had her knee replacement, but she was able to treated with oral antibiotics and is just about finished. Her weight is up she thinks. She is very concerned about her , who seems to be a little bit more unbalanced and has a little bit more memory difficulties secondary to his intracerebral hemorrhage surgery and obviously getting older as well. No change in any of her medication.\par September 12, 2017. Patient is here in followup. She has been diagnosed with a peripheral neuropathy and is getting B12 injections. Her legs get numb when she flexes them up towards herself. Meanwhile, she is not walking much, so she is gaining a lot of weight. No chest pain, no shortness of breath, but is not as active as she used to be.\par January 9, 2018. The patient is here in followup. She had a six-month breast cancer followup in September, and everything checked out okay. She denies any increase in exertional chest pain or shortness of breath. There is a fair amount of stress from her 's memory and her 22-year-old granddaughter who is living with them. No change in medications.\par May 8, 2018. Patient here in followup. Doing well except gaining weight, not losing. Hed CAT scan showing coronary artery calcifications, and she was very concerned about the report, but I explained it is a normal finding. At this age and does not necessarily correlate with coronary artery disease. The report also mentioned enlarged heart and mitral valve calcification. No recent echo. Here her blood pressure is excellent. EKG unchanged.\par May 14, 2018. Returned for echocardiogram. Moderate MR. Mildly calcified aortic valve. Mild LAE. Normal LV function with concentric remodeling and mild diastolic dysfunction. Mild SYDNEE, with normal RV size and function. Moderate to severe TR with RVSP 44.\par September 13, 2018. Patient here to followup with her . No real complaints. Still, dyspnea on exertion going up stairs, but no chest pressure, and no change. VPCs on EKG, and on exam. Asymptomatic.\par October 29, 2018. On the 19th patient went to GI for colitis type complaints and was found to be in rapid A. fib. Sent to ER at [facility]. Converted with Cardizem and was started on Xarelto and discharged. I spoke with her on the 24th and arranged to see her today. In the meantime over the weekend she was readmitted to [facility] because of palpitations although not clear that she was in atrial fibrillation. She is here today in sinus rhythm with very frequent APCs. Quadrigeminy. For some reason she stopped her other medications, including her beta blocker her diuretic her statin and Plavix. She is also on metronidazole and Cipro because of her colitis.\par November 19, 2018. Patient is here in followup and for a clearance for a sigmoid biopsy and colonoscopy by Dr. Jnony Connor. The patient remains in sinus rhythm on sotalol. She is asymptomatic. She is somewhat nervous about the anesthesia and procedure. It is scheduled for November 29.She had been on 15 mg of Xarelto from the hospital, but based on her kidney function should be on 20 mg. If she stops the anticoagulation she does need to take baby aspirin because of her cardiac stent.\par January 18, 2019. Patient here in followup. Had a chest x-ray around January 4 and was nervous because he reports that prominent pulmonary arteries, possible pulmonary hypertension. Patient is here and remains in sinus rhythm. No signs of pulmonary hypertension on her EKG. (Echo in May, 2018 did have moderate TR with RVSP 44.) A little depressed over her macular degeneration and now she can't drive etc. otherwise no cardiac complaints. Weight is stable.

## 2019-01-18 NOTE — PHYSICAL EXAM
[General Appearance - Well Developed] : well developed [Normal Appearance] : normal appearance [Well Groomed] : well groomed [General Appearance - Well Nourished] : well nourished [No Deformities] : no deformities [General Appearance - In No Acute Distress] : no acute distress [Normal Conjunctiva] : the conjunctiva exhibited no abnormalities [Eyelids - No Xanthelasma] : the eyelids demonstrated no xanthelasmas [Normal Oral Mucosa] : normal oral mucosa [No Oral Pallor] : no oral pallor [No Oral Cyanosis] : no oral cyanosis [Normal Jugular Venous A Waves Present] : normal jugular venous A waves present [Normal Jugular Venous V Waves Present] : normal jugular venous V waves present [No Jugular Venous Epps A Waves] : no jugular venous epps A waves [Respiration, Rhythm And Depth] : normal respiratory rhythm and effort [Exaggerated Use Of Accessory Muscles For Inspiration] : no accessory muscle use [Auscultation Breath Sounds / Voice Sounds] : lungs were clear to auscultation bilaterally [Heart Rate And Rhythm] : heart rate and rhythm were normal [Heart Sounds] : normal S1 and S2 [Murmurs] : no murmurs present [Abdomen Soft] : soft [Abdomen Tenderness] : non-tender [Abdomen Mass (___ Cm)] : no abdominal mass palpated [Abnormal Walk] : normal gait [Gait - Sufficient For Exercise Testing] : the gait was sufficient for exercise testing [Nail Clubbing] : no clubbing of the fingernails [Cyanosis, Localized] : no localized cyanosis [Petechial Hemorrhages (___cm)] : no petechial hemorrhages [FreeTextEntry1] : No ankle edema (just "fat" ankles). Pedal pulses intact. Pulses symmetric, and no pulse deficits [Skin Color & Pigmentation] : normal skin color and pigmentation [] : no rash [No Venous Stasis] : no venous stasis [Skin Lesions] : no skin lesions [No Skin Ulcers] : no skin ulcer [No Xanthoma] : no  xanthoma was observed [Oriented To Time, Place, And Person] : oriented to person, place, and time [Affect] : the affect was normal [Mood] : the mood was normal [No Anxiety] : not feeling anxious

## 2019-01-18 NOTE — REVIEW OF SYSTEMS
[Recent Weight Gain (___ Lbs)] : no recent weight gain [Feeling Fatigued] : not feeling fatigued [Recent Weight Loss (___ Lbs)] : no recent weight loss [Shortness Of Breath] : no shortness of breath [Dyspnea on exertion] : not dyspnea during exertion [Chest  Pressure] : no chest pressure [Chest Pain] : no chest pain [Lower Ext Edema] : no extremity edema [Leg Claudication] : no intermittent leg claudication [Palpitations] : no palpitations [Abdominal Pain] : no abdominal pain [Heartburn] : no heartburn [Change In The Stool] : no change in stool [Joint Pain] : joint pain [Joint Stiffness] : joint stiffness [see HPI] : see HPI [Dizziness] : no dizziness [Numbness (Hypesthesia)] : numbness [Depression] : depression [Under Stress] : under stress [Suicidal] : not suicidal [Negative] : Heme/Lymph

## 2019-01-21 LAB
ALBUMIN SERPL ELPH-MCNC: 4 G/DL
ALP BLD-CCNC: 76 U/L
ALT SERPL-CCNC: 8 U/L
ANION GAP SERPL CALC-SCNC: 17 MMOL/L
AST SERPL-CCNC: 15 U/L
BASOPHILS # BLD AUTO: 0.02 K/UL
BASOPHILS NFR BLD AUTO: 0.4 %
BILIRUB SERPL-MCNC: 0.4 MG/DL
BUN SERPL-MCNC: 23 MG/DL
CALCIUM SERPL-MCNC: 9.6 MG/DL
CHLORIDE SERPL-SCNC: 98 MMOL/L
CHOLEST SERPL-MCNC: 125 MG/DL
CHOLEST/HDLC SERPL: 3 RATIO
CK SERPL-CCNC: 61 U/L
CO2 SERPL-SCNC: 23 MMOL/L
CREAT SERPL-MCNC: 0.8 MG/DL
EOSINOPHIL # BLD AUTO: 0.05 K/UL
EOSINOPHIL NFR BLD AUTO: 0.9 %
GLUCOSE SERPL-MCNC: 98 MG/DL
HBA1C MFR BLD HPLC: 6.4 %
HCT VFR BLD CALC: 36.2 %
HDLC SERPL-MCNC: 42 MG/DL
HGB BLD-MCNC: 11.3 G/DL
IMM GRANULOCYTES NFR BLD AUTO: 0.5 %
LDLC SERPL CALC-MCNC: 68 MG/DL
LYMPHOCYTES # BLD AUTO: 1.64 K/UL
LYMPHOCYTES NFR BLD AUTO: 29.7 %
MAN DIFF?: NORMAL
MCHC RBC-ENTMCNC: 26.8 PG
MCHC RBC-ENTMCNC: 31.2 GM/DL
MCV RBC AUTO: 85.8 FL
MONOCYTES # BLD AUTO: 0.53 K/UL
MONOCYTES NFR BLD AUTO: 9.6 %
NEUTROPHILS # BLD AUTO: 3.25 K/UL
NEUTROPHILS NFR BLD AUTO: 58.9 %
NT-PROBNP SERPL-MCNC: 193 PG/ML
PLATELET # BLD AUTO: 255 K/UL
POTASSIUM SERPL-SCNC: 4.2 MMOL/L
PROT SERPL-MCNC: 6.9 G/DL
RBC # BLD: 4.22 M/UL
RBC # FLD: 15.7 %
SODIUM SERPL-SCNC: 138 MMOL/L
TRIGL SERPL-MCNC: 75 MG/DL
WBC # FLD AUTO: 5.52 K/UL

## 2019-01-25 ENCOUNTER — MEDICATION RENEWAL (OUTPATIENT)
Age: 84
End: 2019-01-25

## 2019-02-15 ENCOUNTER — RX RENEWAL (OUTPATIENT)
Age: 84
End: 2019-02-15

## 2019-04-29 ENCOUNTER — APPOINTMENT (OUTPATIENT)
Dept: SURGICAL ONCOLOGY | Facility: CLINIC | Age: 84
End: 2019-04-29
Payer: MEDICARE

## 2019-04-29 VITALS
DIASTOLIC BLOOD PRESSURE: 76 MMHG | SYSTOLIC BLOOD PRESSURE: 136 MMHG | HEART RATE: 69 BPM | TEMPERATURE: 97.9 F | OXYGEN SATURATION: 93 %

## 2019-04-29 PROCEDURE — 99214 OFFICE O/P EST MOD 30 MIN: CPT

## 2019-04-29 RX ORDER — MESALAMINE 1000 MG/1
1000 SUPPOSITORY RECTAL
Qty: 30 | Refills: 0 | Status: ACTIVE | COMMUNITY
Start: 2019-02-15

## 2019-04-29 RX ORDER — IPRATROPIUM BROMIDE 42 UG/1
0.06 SPRAY NASAL
Qty: 45 | Refills: 0 | Status: ACTIVE | COMMUNITY
Start: 2019-01-24

## 2019-04-29 NOTE — PHYSICAL EXAM
[Normal] : supple, no neck mass and thyroid not enlarged [Normal Supraclavicular Lymph Nodes] : normal supraclavicular lymph nodes [Normal Axillary Lymph Nodes] : normal axillary lymph nodes [Normal] : oriented to person, place and time, with appropriate affect [de-identified] : irregular rate and rhythm  [de-identified] : 2 by 1 cm firm area in lumpectomy site in the UOQ of the right breast; no other masses or adenopathy bilaterally [FreeTextEntry1] : Deferred

## 2019-04-29 NOTE — CONSULT LETTER
[Dear  ___] : Dear  [unfilled], [Courtesy Letter:] : I had the pleasure of seeing your patient, [unfilled], in my office today. [Please see my note below.] : Please see my note below. [Consult Closing:] : Thank you very much for allowing me to participate in the care of this patient.  If you have any questions, please do not hesitate to contact me. [Sincerely,] : Sincerely, [FreeTextEntry1] : I will keep you informed of the results of the imaging and the biopsy. [FreeTextEntry3] : Kenny Henry MD FACS\par Chief of Surgical Oncology\par \par

## 2019-04-29 NOTE — ASSESSMENT
[FreeTextEntry1] : IMP:\par NE - hx of invasive ductal carcinoma s/p lumpectomy 2012\par On Arimidex 1 mg daily\par Palpable right breast mass\par \par Plan:\par Mammogram, sonogram and BW now \par Sono-guided biopsy of area\par Pt. to stop Xarelto for 48 hours\par \par

## 2019-04-29 NOTE — HISTORY OF PRESENT ILLNESS
[de-identified] : Alee is an 88 year old female who presents to the office for breast cancer follow up visit.  She states she felt a palpable lump in the right breast 10:00 over the weekend which is tender to touch. Denies nipple discharge or left breast masses.  \par \par Past Hx:\par RIGHT breast cancer; s/p RIGHT lumpectomy with sentinel lymph node biopsy in 4/2012, path revealed T1N0 invasive ductal carcinoma, ER/NH+, HER2-; s/p radiation therapy. Patient is currently on Arimidex 1 mg PO daily with no complaints. \par Oncotype Dx= 1\par \par Recent Mammography/Ultrasound done on 5/25/18 which showed no suspicious masses and/or calcifications, no evidence of malignancy; BIRADS 2.\par \par Bloodwork November 2018:\par CA 27-29: 21.1\par CEA: 1.0\par LFTs WNL\par \par A-fib and is currently on Xarelto. \par \par Internist: Dr. Do

## 2019-05-02 ENCOUNTER — APPOINTMENT (OUTPATIENT)
Dept: SURGICAL ONCOLOGY | Facility: CLINIC | Age: 84
End: 2019-05-02
Payer: MEDICARE

## 2019-05-02 ENCOUNTER — LABORATORY RESULT (OUTPATIENT)
Age: 84
End: 2019-05-02

## 2019-05-02 PROCEDURE — 19083 BX BREAST 1ST LESION US IMAG: CPT

## 2019-05-02 NOTE — ASSESSMENT
[FreeTextEntry1] : Right breast palpable area at 10:00 core biopsy performed and clip deployed\par \par Plan:\par Check pathology\par RTO 3 months

## 2019-05-02 NOTE — PROCEDURE
[Core Needle Biopsy] : core needle biopsy ~T ~C was performed  [Patient] : the patient [Area of Mass: ______] : mass identified in the [unfilled] [Risks] : risks [Consent Obtained] : written consent was obtained prior to the procedure and is detailed in the patient's record [___ ml Inj] : [unfilled] ~Uml [1%] : 1% [With Epi] : with epinephrine [Supine] : the patient was placed in the supine position with the neck extended as tolerated [ATEC 9 Gauge Needle] : ATEC 9 gauge needle was used [Betadine] : with betadine solution [Clip Placement ___] : Clip placement [unfilled] [1 Pass] : 1 pass was made through the mass [Sent to Histology] : the specimens were prepared in the usual manner and sent to cytopathologist [Ultrasonic Guidance] : ultrasound guidance was employed [Tolerated Well] : the patient tolerated the procedure well [No Complications] : there were no complications [___ Month(s)] : in [unfilled] month(s)

## 2019-06-02 ENCOUNTER — RX RENEWAL (OUTPATIENT)
Age: 84
End: 2019-06-02

## 2019-06-05 ENCOUNTER — RX RENEWAL (OUTPATIENT)
Age: 84
End: 2019-06-05

## 2019-06-05 ENCOUNTER — APPOINTMENT (OUTPATIENT)
Dept: SURGICAL ONCOLOGY | Facility: CLINIC | Age: 84
End: 2019-06-05

## 2019-06-10 ENCOUNTER — MEDICATION RENEWAL (OUTPATIENT)
Age: 84
End: 2019-06-10

## 2019-07-17 ENCOUNTER — RX RENEWAL (OUTPATIENT)
Age: 84
End: 2019-07-17

## 2019-07-26 ENCOUNTER — APPOINTMENT (OUTPATIENT)
Dept: CARDIOLOGY | Facility: CLINIC | Age: 84
End: 2019-07-26
Payer: MEDICARE

## 2019-07-26 ENCOUNTER — NON-APPOINTMENT (OUTPATIENT)
Age: 84
End: 2019-07-26

## 2019-07-26 VITALS
HEART RATE: 61 BPM | OXYGEN SATURATION: 95 % | BODY MASS INDEX: 32.78 KG/M2 | HEIGHT: 63 IN | WEIGHT: 185 LBS | SYSTOLIC BLOOD PRESSURE: 114 MMHG | TEMPERATURE: 97.5 F | DIASTOLIC BLOOD PRESSURE: 70 MMHG

## 2019-07-26 PROCEDURE — 99214 OFFICE O/P EST MOD 30 MIN: CPT

## 2019-07-26 PROCEDURE — 36415 COLL VENOUS BLD VENIPUNCTURE: CPT

## 2019-07-26 PROCEDURE — 93000 ELECTROCARDIOGRAM COMPLETE: CPT

## 2019-07-26 NOTE — REVIEW OF SYSTEMS
[Recent Weight Gain (___ Lbs)] : no recent weight gain [Feeling Fatigued] : not feeling fatigued [Recent Weight Loss (___ Lbs)] : recent [unfilled] ~Ulb weight loss [Shortness Of Breath] : no shortness of breath [Dyspnea on exertion] : not dyspnea during exertion [Chest  Pressure] : no chest pressure [Chest Pain] : no chest pain [Lower Ext Edema] : no extremity edema [Leg Claudication] : no intermittent leg claudication [Palpitations] : no palpitations [Abdominal Pain] : no abdominal pain [Heartburn] : no heartburn [Change In The Stool] : no change in stool [Joint Pain] : joint pain [Joint Stiffness] : joint stiffness [see HPI] : see HPI [Dizziness] : no dizziness [Numbness (Hypesthesia)] : numbness [Depression] : depression [Under Stress] : under stress [Suicidal] : not suicidal [Negative] : Heme/Lymph

## 2019-07-26 NOTE — PHYSICAL EXAM
[General Appearance - Well Developed] : well developed [Normal Appearance] : normal appearance [Well Groomed] : well groomed [General Appearance - Well Nourished] : well nourished [No Deformities] : no deformities [General Appearance - In No Acute Distress] : no acute distress [Normal Conjunctiva] : the conjunctiva exhibited no abnormalities [Eyelids - No Xanthelasma] : the eyelids demonstrated no xanthelasmas [Normal Oral Mucosa] : normal oral mucosa [No Oral Pallor] : no oral pallor [No Oral Cyanosis] : no oral cyanosis [Normal Jugular Venous A Waves Present] : normal jugular venous A waves present [Normal Jugular Venous V Waves Present] : normal jugular venous V waves present [No Jugular Venous Epps A Waves] : no jugular venous epps A waves [Respiration, Rhythm And Depth] : normal respiratory rhythm and effort [Exaggerated Use Of Accessory Muscles For Inspiration] : no accessory muscle use [Auscultation Breath Sounds / Voice Sounds] : lungs were clear to auscultation bilaterally [Heart Rate And Rhythm] : heart rate and rhythm were normal [Heart Sounds] : normal S1 and S2 [Murmurs] : no murmurs present [Abdomen Soft] : soft [Abdomen Tenderness] : non-tender [Abdomen Mass (___ Cm)] : no abdominal mass palpated [Abnormal Walk] : normal gait [Gait - Sufficient For Exercise Testing] : the gait was sufficient for exercise testing [Nail Clubbing] : no clubbing of the fingernails [Cyanosis, Localized] : no localized cyanosis [Petechial Hemorrhages (___cm)] : no petechial hemorrhages [Skin Color & Pigmentation] : normal skin color and pigmentation [FreeTextEntry1] : No ankle edema (just "fat" ankles). Pedal pulses intact. Pulses symmetric, and no pulse deficits [No Venous Stasis] : no venous stasis [] : no rash [Skin Lesions] : no skin lesions [No Skin Ulcers] : no skin ulcer [No Xanthoma] : no  xanthoma was observed [Oriented To Time, Place, And Person] : oriented to person, place, and time [Affect] : the affect was normal [Mood] : the mood was normal [No Anxiety] : not feeling anxious

## 2019-07-26 NOTE — HISTORY OF PRESENT ILLNESS
[FreeTextEntry1] : (MED HX) Patient with known coronary disease with stents to her circumflex artery and obtuse marginal going back to 2006 and 2008. Normal stress thallium in 2011 before surgery. Symptoms of esophageal reflux on and off that have been different than her angina. However in August, 2012 she had a return of her usual angina which initially responded to bisoprolol but then her symptoms increased and the patient called me on September 7. She was scheduled for a cardiac catheterization on September 10 and found to have a subtotal occlusion of her circumflex. This was treated with a drug-eluting stent and she was sent home on the 11th on aspirin and Plavix and she came  for followup 9/28/12. Since being home she has not had any of those same symptoms and is able to walk up the stairs now without shortness of breath or chest pain.\par She was here in January, 2013 for clearance for cataract surgery. She had no complications and was doing well since then other than having trouble maintaining her weight as she goes to [employer] [facility] [employer] and her  is able to eat cake and other foods without gaining any weight. She denies chest pain or shortness of breath with exertion, although when she lies down in bed she says she feels short of breath briefly.\par August 19, 2013. Here for followup and has no real complaints. She did have some pains in the legs that have been getting better with physical therapy. She denies exertional chest pain or shortness of breath. She is still having trouble losing weight. All seemed acceptable. She had labs with Dr. Do in October. BUN was slightly elevated and the sodium was 131 so he was asking me about cutting back on the diuretic. In addition she was anemic with an MCV of 80.\par January 15, 2014. The patient is here for followup. Her main issue is that she may need carpal tunnel surgery on both sides. She has some atypical pain in the upper left chest and neck area that is nonexertional and sounds more arthritic or musculoskeletal. She denies chest pain or shortness of breath. She was pleased to find out that based on the scale here she has lost 9 pounds.\par Tatiana 10, 2014. The patient is here for followup. She didn't need the carpal tunnel surgery but instead responded well to a steroid injection. However she developed bursitis in her right shoulder and had a steroid shot that just lasted for about a month. Now she is finishing up with physical therapy. Her main complaint is that she just cannot walk like she used to; she just kind of hits a wall and has to slow down. She denies exertional chest pain or shortness of breath and no definite leg pain but thinks it could be her arthritis. She claims her weight is about the same. She has had no change in her medications recently.\par The patient saw Dr. Geronimo in September for preop evaluation and she underwent carpal tunnel surgery on October 2. No complications and she saw Dr. Jaylan Christianson in followup.\par November 14, 2014. The patient is here in followup. When she lies down at night she gets pressure in her chest which feels similar to her previous symptoms and she was worried she might need another stent however she tries to see what happens when she walks up and down the stairs and has absolutely no symptoms whatsoever. Upon further questioning it seems that she was told to go back on the aspirin in addition to the Plavix by the presurgical testing people before the carpal tunnel surgery and it is only since being back on the aspirin that she has these symptoms. There is no shortness of breath. She is having trouble losing weight. I felt fairly certain that her symptoms were reflux and related to the aspirin. The aspirin was stopped and the AcipHex was increased to b.i.d.\par March 23, 2015. Patient is here for followup. On the whole she has been doing well since her last visit without recurrence of her chest pain or shortness of breath. However about 2-3 months ago she had a syncopal episode when she got up from the table and before she realized it she was on the floor. When she came to she was a little shaky but otherwise was okay. No recurrence. The next day she was in pain in her joints and was worried about her knee so  she was checked out by orthopedics and everything was fine. She has had no orthostatic symptoms since then and no lightheadedness or near syncope since then. She is on a diuretic every other day and she does not recall being ill around that time or any reason why she might have been dehydrated. She is on 5 mg of bisoprolol but has had no other episodes of syncope or near syncope in the last 2 months. Workup was unremarkable\par July 29, 2015. The patient is here in followup. She is complaining that her legs and arms feel heavy as if she's walking in sand or water. She is borderline hypotensive and it could just be because of the heat and volume depletion but the patient has not lost any weight and she claims that the symptoms have gone on for a few months not just recently when it's been hot. She denies chest pain or exertional shortness of breath. She has been having some trouble sleeping as well. Her EKG is totally within normal limits. I elected to hold her valsartan and then check labs for kidney function looking for volume depletion as to whether we should cut back on the diuretic more than just every other day. The possibility includes whether these are side effects of statin. No recurrence of her syncope. \par November 12, 2015. The patient is here for followup. She is now off Diovan because of her orthostatic symptoms and these seemed to have improved. She has no specific complaints. She is asking about aspirin versus Plavix, and she has remained just on Plavix most of this time. She does have esophageal reflux and is under treatment and under control. She saw Dr. Olmos in followup for her breast cancer and everything is fine and she is to see him again in 6 months. She denies exertional chest pain or shortness of breath. Her weight is still an issue.\par March 9, 2016. The patient is here for followup. Has been under a fair amount of stress as well as increased physical activity around the house with her 's intracerebral hemorrhage. He is improving greatly which has lessened her stress somewhat. She does not complain of chest pain or shortness of breath. She has an upcoming followup with oncologist and that'll be four years so far since her cancer surgery.\par June 16, 2016. Patient is here for followup with a little stress as her  is doing a little better, but she's been having episodes of trouble with her balance. No vertigo. No lightheadedness, syncope, or near syncope. She can't even feel unbalanced while sitting. Usually can last an hour but can function somewhat. Happens about once a week. Does not seem to be related to food or medication.\par October 13, 2016. Patient is here to follow up with her . They celebrated their 65th wedding anniversary. She is doing about the same, with some stress. Had a workup fpr vertebrobasilar insufficiency. She has numbness in both feet, but not in her hands and she will be having an EMG coming up soon.\par January 10, 2017. Patient here for followup. Not feeling well for about a month or so. Has chest pain and back pain when she takes a deep breath, but not with exertion. Needs laser surgery on her eyes complains about her body from feet to her head etc. Never did start the B12 injections that Dr. Rosenstein wanted. She is worried she might need another stent but again no exertional component.\par May 16, 2017. Patient here in followup. No chest pain or shortness of breath. Tripped on the carpet and got an infection in the knee, where she had her knee replacement, but she was able to treated with oral antibiotics and is just about finished. Her weight is up she thinks. She is very concerned about her , who seems to be a little bit more unbalanced and has a little bit more memory difficulties secondary to his intracerebral hemorrhage surgery and obviously getting older as well. No change in any of her medication.\par September 12, 2017. Patient is here in followup. She has been diagnosed with a peripheral neuropathy and is getting B12 injections. Her legs get numb when she flexes them up towards herself. Meanwhile, she is not walking much, so she is gaining a lot of weight. No chest pain, no shortness of breath, but is not as active as she used to be.\par January 9, 2018. The patient is here in followup. She had a six-month breast cancer followup in September, and everything checked out okay. She denies any increase in exertional chest pain or shortness of breath. There is a fair amount of stress from her 's memory and her 22-year-old granddaughter who is living with them. No change in medications.\par May 8, 2018. Patient here in followup. Doing well except gaining weight, not losing. Hed CAT scan showing coronary artery calcifications, and she was very concerned about the report, but I explained it is a normal finding. At this age and does not necessarily correlate with coronary artery disease. The report also mentioned enlarged heart and mitral valve calcification. No recent echo. Here her blood pressure is excellent. EKG unchanged.\par May 14, 2018. Returned for echocardiogram. Moderate MR. Mildly calcified aortic valve. Mild LAE. Normal LV function with concentric remodeling and mild diastolic dysfunction. Mild SYDNEE, with normal RV size and function. Moderate to severe TR with RVSP 44.\par September 13, 2018. Patient here to followup with her . No real complaints. Still, dyspnea on exertion going up stairs, but no chest pressure, and no change. VPCs on EKG, and on exam. Asymptomatic.\par October 29, 2018. On the 19th patient went to GI for colitis type complaints and was found to be in rapid A. fib. [facility]. Converted with Cardizem and was started on Xarelto and discharged. I spoke with her on the 24th and arranged to see her today. In the meantime over the weekend she was readmitted to [facility] because of palpitations although not clear that she was in atrial fibrillation. She is here today in sinus rhythm with very frequent APCs. Quadrigeminy. For some reason she stopped her other medications, including her beta blocker her diuretic her statin and Plavix. She is also on metronidazole and Cipro because of her colitis.\par November 19, 2018. Patient is here in followup and for a clearance for a sigmoid biopsy and colonoscopy by Dr. Jonny Connor. The patient remains in sinus rhythm on sotalol. She is asymptomatic. She is somewhat nervous about the anesthesia and procedure. It is scheduled for November 29.She had been on 15 mg of Xarelto from the hospital, but based on her kidney function should be on 20 mg. If she stops the anticoagulation she does need to take baby aspirin because of her cardiac stent.\par January 18, 2019. Patient here in followup. Had a chest x-ray around January 4 and was nervous because he reports that prominent pulmonary arteries, possible pulmonary hypertension. Patient is here and remains in sinus rhythm. No signs of pulmonary hypertension on her EKG. (Echo in May, 2018 did have moderate TR with RVSP 44.) A little depressed over her macular degeneration and now she can't drive etc. otherwise no cardiac complaints. Weight is stable.\par July 26, 2019. Patient here in followup. Remains in sinus rhythm. Saw Dr. Rosenstein for workup of her peripheral neuropathy. He told her she was diabetic, although her hemoglobin A1c was 6.4 in June, which is where has been her last 3 visits here. In fact, she stopped eating cake and now has lost 12 pounds. By the same token, she could have a diabetic neuropathy, even before the full blown diagnosis of diabetes. In any case, she seems to be fairly stable, although a lot of stress because of her , as well as her own macular degeneration. She is also now on B12 shots. Her labs were also notable for a weekly migrating paraprotein that could not be characterized and homogenous JESSE at 1:320

## 2019-07-26 NOTE — CARDIOLOGY SUMMARY
[No Ischemia] : no Ischemia [___] : [unfilled] [LVEF ___%] : LVEF [unfilled]% [None] : normal LV function [Enlarged] : enlarged LA size [Mild] : mild mitral regurgitation [___] : [unfilled]

## 2019-07-29 LAB
ALBUMIN SERPL ELPH-MCNC: 4.1 G/DL
ALP BLD-CCNC: 74 U/L
ALT SERPL-CCNC: 6 U/L
ANION GAP SERPL CALC-SCNC: 11 MMOL/L
AST SERPL-CCNC: 14 U/L
BASOPHILS # BLD AUTO: 0.04 K/UL
BASOPHILS NFR BLD AUTO: 0.7 %
BILIRUB SERPL-MCNC: 0.4 MG/DL
BUN SERPL-MCNC: 23 MG/DL
CALCIUM SERPL-MCNC: 9.7 MG/DL
CHLORIDE SERPL-SCNC: 98 MMOL/L
CHOLEST SERPL-MCNC: 113 MG/DL
CHOLEST/HDLC SERPL: 2.7 RATIO
CO2 SERPL-SCNC: 25 MMOL/L
CREAT SERPL-MCNC: 0.85 MG/DL
EOSINOPHIL # BLD AUTO: 0.04 K/UL
EOSINOPHIL NFR BLD AUTO: 0.7 %
ESTIMATED AVERAGE GLUCOSE: 128 MG/DL
GLUCOSE SERPL-MCNC: 96 MG/DL
HBA1C MFR BLD HPLC: 6.1 %
HCT VFR BLD CALC: 34.7 %
HDLC SERPL-MCNC: 42 MG/DL
HGB BLD-MCNC: 10.8 G/DL
IMM GRANULOCYTES NFR BLD AUTO: 0.2 %
LDLC SERPL CALC-MCNC: 57 MG/DL
LYMPHOCYTES # BLD AUTO: 1.79 K/UL
LYMPHOCYTES NFR BLD AUTO: 33.3 %
MAN DIFF?: NORMAL
MCHC RBC-ENTMCNC: 26.7 PG
MCHC RBC-ENTMCNC: 31.1 GM/DL
MCV RBC AUTO: 85.9 FL
MONOCYTES # BLD AUTO: 0.62 K/UL
MONOCYTES NFR BLD AUTO: 11.5 %
NEUTROPHILS # BLD AUTO: 2.88 K/UL
NEUTROPHILS NFR BLD AUTO: 53.6 %
NT-PROBNP SERPL-MCNC: 139 PG/ML
PLATELET # BLD AUTO: 238 K/UL
POTASSIUM SERPL-SCNC: 4.5 MMOL/L
PROT SERPL-MCNC: 6.8 G/DL
RBC # BLD: 4.04 M/UL
RBC # FLD: 15.4 %
SODIUM SERPL-SCNC: 134 MMOL/L
TRIGL SERPL-MCNC: 69 MG/DL
TSH SERPL-ACNC: 2.31 UIU/ML
VIT B12 SERPL-MCNC: 513 PG/ML
WBC # FLD AUTO: 5.38 K/UL

## 2019-08-28 ENCOUNTER — RX RENEWAL (OUTPATIENT)
Age: 84
End: 2019-08-28

## 2019-09-09 ENCOUNTER — FORM ENCOUNTER (OUTPATIENT)
Age: 84
End: 2019-09-09

## 2019-09-10 ENCOUNTER — APPOINTMENT (OUTPATIENT)
Dept: SURGICAL ONCOLOGY | Facility: CLINIC | Age: 84
End: 2019-09-10
Payer: MEDICARE

## 2019-09-10 ENCOUNTER — APPOINTMENT (OUTPATIENT)
Dept: ULTRASOUND IMAGING | Facility: IMAGING CENTER | Age: 84
End: 2019-09-10

## 2019-09-10 ENCOUNTER — OUTPATIENT (OUTPATIENT)
Dept: OUTPATIENT SERVICES | Facility: HOSPITAL | Age: 84
LOS: 1 days | End: 2019-09-10
Payer: MEDICARE

## 2019-09-10 VITALS
OXYGEN SATURATION: 95 % | WEIGHT: 185 LBS | BODY MASS INDEX: 32.78 KG/M2 | HEIGHT: 63 IN | HEART RATE: 59 BPM | RESPIRATION RATE: 16 BRPM | DIASTOLIC BLOOD PRESSURE: 68 MMHG | SYSTOLIC BLOOD PRESSURE: 117 MMHG

## 2019-09-10 DIAGNOSIS — R22.32 LOCALIZED SWELLING, MASS AND LUMP, LEFT UPPER LIMB: ICD-10-CM

## 2019-09-10 PROCEDURE — 76882 US LMTD JT/FCL EVL NVASC XTR: CPT

## 2019-09-10 PROCEDURE — 76882 US LMTD JT/FCL EVL NVASC XTR: CPT | Mod: 26,LT

## 2019-09-10 PROCEDURE — 99214 OFFICE O/P EST MOD 30 MIN: CPT

## 2019-09-10 NOTE — PHYSICAL EXAM
[Normal] : supple, no neck mass and thyroid not enlarged [Normal Axillary Lymph Nodes] : normal axillary lymph nodes [Normal Supraclavicular Lymph Nodes] : normal supraclavicular lymph nodes [Normal] : grossly intact [de-identified] : irregular rate and rhythm  [de-identified] : 2 by 1 cm firm area in lumpectomy site in the UOQ of the right breast; no other masses or adenopathy bilaterally [de-identified] : Approximately 5-6 cm subcutaneous mass left medial forearm

## 2019-09-10 NOTE — HISTORY OF PRESENT ILLNESS
[de-identified] : Alee is an 88 year old female who presents to the office for breast cancer follow up visit.  .  \par \par Past Hx:\par RIGHT breast cancer; s/p RIGHT lumpectomy with sentinel lymph node biopsy in 4/2012, path revealed T1N0 invasive ductal carcinoma, ER/OK+, HER2-; s/p radiation therapy. Patient is currently on Arimidex 1 mg PO daily with no complaints. \par Oncotype Dx= 1\par \par She was last seen in April 2019 at which time she had recently noted a palpable lump in the right breast 10:00.  She underwent an ultrasound guided biopsy with benign findings. \par \par Bilateral mammogram and sonogram in May 2019 demonstrated benign findings and nothing was seen in the area of palpable concern (BIRADS 2). \par \par Labs 5/2019:\par CA 27-29: 20 U/ml\par CEA: <0.9 ng/ml\par LFTs WNL\par \par She denies any interval change in the palpable right breast mass and denies any additional breast masses or nipple discharge, however, notes a few week history of a lump involving her left forearm which has increased in size over time.  There is associated discomfort at times.  She denies any recent local trauma. \par \par A-fib and is currently on Xarelto. \par \par Internist: Dr. Do

## 2019-09-10 NOTE — ASSESSMENT
[FreeTextEntry1] : IMP:\par NE - on Arimidex 1 mg daily\par S/p benign biopsy of palpable right breast mass\par Left arm mass\par \par Plan:\par RTO q 6 months with blood work\par Mammogram/sonogram 5/2020\par Ultrasound left arm now\par \par

## 2019-09-13 ENCOUNTER — APPOINTMENT (OUTPATIENT)
Dept: CARDIOLOGY | Facility: CLINIC | Age: 84
End: 2019-09-13

## 2019-09-18 ENCOUNTER — CHART COPY (OUTPATIENT)
Age: 84
End: 2019-09-18

## 2019-11-08 ENCOUNTER — APPOINTMENT (OUTPATIENT)
Dept: CARDIOLOGY | Facility: CLINIC | Age: 84
End: 2019-11-08
Payer: MEDICARE

## 2019-11-08 ENCOUNTER — NON-APPOINTMENT (OUTPATIENT)
Age: 84
End: 2019-11-08

## 2019-11-08 VITALS
DIASTOLIC BLOOD PRESSURE: 82 MMHG | HEIGHT: 63 IN | WEIGHT: 179 LBS | TEMPERATURE: 97.5 F | SYSTOLIC BLOOD PRESSURE: 129 MMHG | BODY MASS INDEX: 31.71 KG/M2 | OXYGEN SATURATION: 97 % | HEART RATE: 53 BPM

## 2019-11-08 PROCEDURE — 99214 OFFICE O/P EST MOD 30 MIN: CPT

## 2019-11-08 PROCEDURE — 36415 COLL VENOUS BLD VENIPUNCTURE: CPT

## 2019-11-08 PROCEDURE — 93000 ELECTROCARDIOGRAM COMPLETE: CPT

## 2019-11-08 RX ORDER — METFORMIN HYDROCHLORIDE 1000 MG/1
1000 TABLET, COATED ORAL
Qty: 60 | Refills: 1 | Status: ACTIVE | COMMUNITY
Start: 2019-11-08

## 2019-11-08 NOTE — REASON FOR VISIT
[FreeTextEntry1] : Followup for patient with cardiac stents and risk factors for CAD, now with paroxysmal atrial fib and DM

## 2019-11-08 NOTE — PHYSICAL EXAM
[Well Groomed] : well groomed [General Appearance - Well Developed] : well developed [Normal Appearance] : normal appearance [General Appearance - Well Nourished] : well nourished [No Deformities] : no deformities [General Appearance - In No Acute Distress] : no acute distress [Eyelids - No Xanthelasma] : the eyelids demonstrated no xanthelasmas [Normal Conjunctiva] : the conjunctiva exhibited no abnormalities [No Oral Pallor] : no oral pallor [Normal Oral Mucosa] : normal oral mucosa [No Oral Cyanosis] : no oral cyanosis [Normal Jugular Venous A Waves Present] : normal jugular venous A waves present [Normal Jugular Venous V Waves Present] : normal jugular venous V waves present [No Jugular Venous Epps A Waves] : no jugular venous epps A waves [Respiration, Rhythm And Depth] : normal respiratory rhythm and effort [Exaggerated Use Of Accessory Muscles For Inspiration] : no accessory muscle use [Heart Rate And Rhythm] : heart rate and rhythm were normal [Auscultation Breath Sounds / Voice Sounds] : lungs were clear to auscultation bilaterally [Heart Sounds] : normal S1 and S2 [Murmurs] : no murmurs present [Abdomen Tenderness] : non-tender [Abdomen Soft] : soft [Abdomen Mass (___ Cm)] : no abdominal mass palpated [Abnormal Walk] : normal gait [Gait - Sufficient For Exercise Testing] : the gait was sufficient for exercise testing [Nail Clubbing] : no clubbing of the fingernails [Cyanosis, Localized] : no localized cyanosis [Petechial Hemorrhages (___cm)] : no petechial hemorrhages [FreeTextEntry1] : No ankle edema (just "fat" ankles). Pedal pulses intact. Pulses symmetric, and no pulse deficits [Skin Color & Pigmentation] : normal skin color and pigmentation [No Venous Stasis] : no venous stasis [] : no rash [No Skin Ulcers] : no skin ulcer [Skin Lesions] : no skin lesions [No Xanthoma] : no  xanthoma was observed [Oriented To Time, Place, And Person] : oriented to person, place, and time [Affect] : the affect was normal [Mood] : the mood was normal [No Anxiety] : not feeling anxious

## 2019-11-08 NOTE — REVIEW OF SYSTEMS
[Recent Weight Gain (___ Lbs)] : no recent weight gain [Feeling Fatigued] : not feeling fatigued [Recent Weight Loss (___ Lbs)] : recent [unfilled] ~Ulb weight loss [Shortness Of Breath] : no shortness of breath [Dyspnea on exertion] : not dyspnea during exertion [Chest  Pressure] : no chest pressure [Chest Pain] : no chest pain [Lower Ext Edema] : no extremity edema [Leg Claudication] : no intermittent leg claudication [Palpitations] : no palpitations [Heartburn] : no heartburn [Abdominal Pain] : no abdominal pain [Change In The Stool] : no change in stool [Joint Pain] : joint pain [Joint Stiffness] : joint stiffness [Dizziness] : no dizziness [see HPI] : see HPI [Numbness (Hypesthesia)] : numbness [Depression] : depression [Under Stress] : under stress [Suicidal] : not suicidal [Negative] : Endocrine

## 2019-11-08 NOTE — HISTORY OF PRESENT ILLNESS
[FreeTextEntry1] : (MED HX) Patient with known coronary disease with stents to her circumflex artery and obtuse marginal going back to 2006 and 2008. Normal stress thallium in 2011 before surgery. Symptoms of esophageal reflux on and off that have been different than her angina. However in August, 2012 she had a return of her usual angina which initially responded to bisoprolol but then her symptoms increased and the patient called me on September 7. She was scheduled for a cardiac catheterization on September 10 and found to have a subtotal occlusion of her circumflex. This was treated with a drug-eluting stent and she was sent home on the 11th on aspirin and Plavix and she came  for followup 9/28/12. Since being home she has not had any of those same symptoms and is able to walk up the stairs now without shortness of breath or chest pain.\par She was here in January, 2013 for clearance for cataract surgery. She had no complications and was doing well since then other than having trouble maintaining her weight as she goes to [employer] and her  is able to eat cake and other foods without gaining any weight. She denies chest pain or shortness of breath with exertion, although when she lies down in bed she says she feels short of breath briefly.\par August 19, 2013. Here for followup and has no real complaints. She did have some pains in the legs that have been getting better with physical therapy. She denies exertional chest pain or shortness of breath. She is still having trouble losing weight. All seemed acceptable. She had labs with Dr. Do in October. BUN was slightly elevated and the sodium was 131 so he was asking me about cutting back on the diuretic. In addition she was anemic with an MCV of 80.\par January 15, 2014. The patient is here for followup. Her main issue is that she may need carpal tunnel surgery on both sides. She has some atypical pain in the upper left chest and neck area that is nonexertional and sounds more arthritic or musculoskeletal. She denies chest pain or shortness of breath. She was pleased to find out that based on the scale here she has lost 9 pounds.\par Tatiana 10, 2014. The patient is here for followup. She didn't need the carpal tunnel surgery but instead responded well to a steroid injection. However she developed bursitis in her right shoulder and had a steroid shot that just lasted for about a month. Now she is finishing up with physical therapy. Her main complaint is that she just cannot walk like she used to; she just kind of hits a wall and has to slow down. She denies exertional chest pain or shortness of breath and no definite leg pain but thinks it could be her arthritis. She claims her weight is about the same. She has had no change in her medications recently.\par The patient saw Dr. Geronimo in September for preop evaluation and she underwent carpal tunnel surgery on October 2. No complications and she saw Dr. Jaylan Christianson in followup.\par November 14, 2014. The patient is here in followup. When she lies down at night she gets pressure in her chest which feels similar to her previous symptoms and she was worried she might need another stent however she tries to see what happens when she walks up and down the stairs and has absolutely no symptoms whatsoever. Upon further questioning it seems that she was told to go back on the aspirin in addition to the Plavix by the presurgical testing people before the carpal tunnel surgery and it is only since being back on the aspirin that she has these symptoms. There is no shortness of breath. She is having trouble losing weight. I felt fairly certain that her symptoms were reflux and related to the aspirin. The aspirin was stopped and the AcipHex was increased to b.i.d.\par March 23, 2015. Patient is here for followup. On the whole she has been doing well since her last visit without recurrence of her chest pain or shortness of breath. However about 2-3 months ago she had a syncopal episode when she got up from the table and before she realized it she was on the floor. When she came to she was a little shaky but otherwise was okay. No recurrence. The next day she was in pain in her joints and was worried about her knee so  she was checked out by orthopedics and everything was fine. She has had no orthostatic symptoms since then and no lightheadedness or near syncope since then. She is on a diuretic every other day and she does not recall being ill around that time or any reason why she might have been dehydrated. She is on 5 mg of bisoprolol but has had no other episodes of syncope or near syncope in the last 2 months. Workup was unremarkable\par July 29, 2015. The patient is here in followup. She is complaining that her legs and arms feel heavy as if she's walking in sand or water. She is borderline hypotensive and it could just be because of the heat and volume depletion but the patient has not lost any weight and she claims that the symptoms have gone on for a few months not just recently when it's been hot. She denies chest pain or exertional shortness of breath. She has been having some trouble sleeping as well. Her EKG is totally within normal limits. I elected to hold her valsartan and then check labs for kidney function looking for volume depletion as to whether we should cut back on the diuretic more than just every other day. The possibility includes whether these are side effects of statin. No recurrence of her syncope. \par November 12, 2015. The patient is here for followup. She is now off Diovan because of her orthostatic symptoms and these seemed to have improved. She has no specific complaints. She is asking about aspirin versus Plavix, and she has remained just on Plavix most of this time. She does have esophageal reflux and is under treatment and under control. She saw Dr. Olmos in followup for her breast cancer and everything is fine and she is to see him again in 6 months. She denies exertional chest pain or shortness of breath. Her weight is still an issue.\par March 9, 2016. The patient is here for followup. Has been under a fair amount of stress as well as increased physical activity around the house with her 's intracerebral hemorrhage. He is improving greatly which has lessened her stress somewhat. She does not complain of chest pain or shortness of breath. She has an upcoming followup with oncologist and that'll be four years so far since her cancer surgery.\par June 16, 2016. Patient is here for followup with a little stress as her  is doing a little better, but she's been having episodes of trouble with her balance. No vertigo. No lightheadedness, syncope, or near syncope. She can't even feel unbalanced while sitting. Usually can last an hour but can function somewhat. Happens about once a week. Does not seem to be related to food or medication.\par October 13, 2016. Patient is here to follow up with her . They celebrated their 65th wedding anniversary. She is doing about the same, with some stress. Had a workup fpr vertebrobasilar insufficiency. She has numbness in both feet, but not in her hands and she will be having an EMG coming up soon.\par January 10, 2017. Patient here for followup. Not feeling well for about a month or so. Has chest pain and back pain when she takes a deep breath, but not with exertion. Needs laser surgery on her eyes complains about her body from feet to her head etc. Never did start the B12 injections that Dr. Rosenstein wanted. She is worried she might need another stent but again no exertional component.\par May 16, 2017. Patient here in followup. No chest pain or shortness of breath. Tripped on the carpet and got an infection in the knee, where she had her knee replacement, but she was able to treated with oral antibiotics and is just about finished. Her weight is up she thinks. She is very concerned about her , who seems to be a little bit more unbalanced and has a little bit more memory difficulties secondary to his intracerebral hemorrhage surgery and obviously getting older as well. No change in any of her medication.\par September 12, 2017. Patient is here in followup. She has been diagnosed with a peripheral neuropathy and is getting B12 injections. Her legs get numb when she flexes them up towards herself. Meanwhile, she is not walking much, so she is gaining a lot of weight. No chest pain, no shortness of breath, but is not as active as she used to be.\par January 9, 2018. The patient is here in followup. She had a six-month breast cancer followup in September, and everything checked out okay. She denies any increase in exertional chest pain or shortness of breath. There is a fair amount of stress from her 's memory and her 22-year-old granddaughter who is living with them. No change in medications.\par May 8, 2018. Patient here in followup. Doing well except gaining weight, not losing. Hed CAT scan showing coronary artery calcifications, and she was very concerned about the report, but I explained it is a normal finding. At this age and does not necessarily correlate with coronary artery disease. The report also mentioned enlarged heart and mitral valve calcification. No recent echo. Here her blood pressure is excellent. EKG unchanged.\par May 14, 2018. Returned for echocardiogram. Moderate MR. Mildly calcified aortic valve. Mild LAE. Normal LV function with concentric remodeling and mild diastolic dysfunction. Mild SYDNEE, with normal RV size and function. Moderate to severe TR with RVSP 44.\par September 13, 2018. Patient here to followup with her . No real complaints. Still, dyspnea on exertion going up stairs, but no chest pressure, and no change. VPCs on EKG, and on exam. Asymptomatic.\par October 29, 2018. On the 19th patient went to GI for colitis type complaints and was found to be in rapid A. fib. Sent to ER at [facility]. Converted with Cardizem and was started on Xarelto and discharged. I spoke with her on the 24th and arranged to see her today. In the meantime over the weekend she was readmitted to [facility] because of palpitations although not clear that she was in atrial fibrillation. She is here today in sinus rhythm with very frequent APCs. Quadrigeminy. For some reason she stopped her other medications, including her beta blocker her diuretic her statin and Plavix. She is also on metronidazole and Cipro because of her colitis.\par November 19, 2018. Patient is here in followup and for a clearance for a sigmoid biopsy and colonoscopy by Dr. Jonny Connor. The patient remains in sinus rhythm on sotalol. She is asymptomatic. She is somewhat nervous about the anesthesia and procedure. It is scheduled for November 29.She had been on 15 mg of Xarelto from the hospital, but based on her kidney function should be on 20 mg. If she stops the anticoagulation she does need to take baby aspirin because of her cardiac stent.\par January 18, 2019. Patient here in followup. Had a chest x-ray around January 4 and was nervous because he reports that prominent pulmonary arteries, possible pulmonary hypertension. Patient is here and remains in sinus rhythm. No signs of pulmonary hypertension on her EKG. (Echo in May, 2018 did have moderate TR with RVSP 44.) A little depressed over her macular degeneration and now she can't drive etc. otherwise no cardiac complaints. Weight is stable.\par July 26, 2019. Patient here in followup. Remains in sinus rhythm. Saw Dr. Rosenstein for workup of her peripheral neuropathy. He told her she was diabetic, although her hemoglobin A1c was 6.4 in June, which is where has been her last 3 visits here. In fact, she stopped eating cake and now has lost 12 pounds. By the same token, she could have a diabetic neuropathy, even before the full blown diagnosis of diabetes. In any case, she seems to be fairly stable, although a lot of stress because of her , as well as her own macular degeneration. She is also now on B12 shots. Her labs were also notable for a weekly migrating paraprotein that could not be characterized and homogenous JESSE at 1:320.\par November 8, 2019.  Patient here in follow-up.  "I am diabetic now".  She is on metformin 1000 and day and tolerating it.  She cut out cake and cookies and has lost 6 pounds since July and 18 pounds since January.  Only stress is regarding her .  Had a lump in her arm that turned out to be a biceps tear and she may be going to physical therapy.  Had a flu shot already.

## 2019-11-10 LAB
ALBUMIN SERPL ELPH-MCNC: 4.2 G/DL
ALP BLD-CCNC: 66 U/L
ALT SERPL-CCNC: 8 U/L
ANION GAP SERPL CALC-SCNC: 13 MMOL/L
AST SERPL-CCNC: 16 U/L
BASOPHILS # BLD AUTO: 0.03 K/UL
BASOPHILS NFR BLD AUTO: 0.6 %
BILIRUB SERPL-MCNC: 0.4 MG/DL
BUN SERPL-MCNC: 19 MG/DL
CALCIUM SERPL-MCNC: 9.7 MG/DL
CHLORIDE SERPL-SCNC: 99 MMOL/L
CHOLEST SERPL-MCNC: 132 MG/DL
CHOLEST/HDLC SERPL: 2.8 RATIO
CO2 SERPL-SCNC: 24 MMOL/L
CREAT SERPL-MCNC: 0.73 MG/DL
EOSINOPHIL # BLD AUTO: 0.04 K/UL
EOSINOPHIL NFR BLD AUTO: 0.8 %
ESTIMATED AVERAGE GLUCOSE: 126 MG/DL
GLUCOSE SERPL-MCNC: 95 MG/DL
HBA1C MFR BLD HPLC: 6 %
HCT VFR BLD CALC: 36.1 %
HDLC SERPL-MCNC: 48 MG/DL
HGB BLD-MCNC: 11.2 G/DL
IMM GRANULOCYTES NFR BLD AUTO: 0.2 %
LDLC SERPL CALC-MCNC: 72 MG/DL
LYMPHOCYTES # BLD AUTO: 1.71 K/UL
LYMPHOCYTES NFR BLD AUTO: 36.2 %
MAN DIFF?: NORMAL
MCHC RBC-ENTMCNC: 27.5 PG
MCHC RBC-ENTMCNC: 31 GM/DL
MCV RBC AUTO: 88.7 FL
MONOCYTES # BLD AUTO: 0.47 K/UL
MONOCYTES NFR BLD AUTO: 10 %
NEUTROPHILS # BLD AUTO: 2.46 K/UL
NEUTROPHILS NFR BLD AUTO: 52.2 %
NT-PROBNP SERPL-MCNC: 345 PG/ML
PLATELET # BLD AUTO: 225 K/UL
POTASSIUM SERPL-SCNC: 4.8 MMOL/L
PROT SERPL-MCNC: 6.5 G/DL
RBC # BLD: 4.07 M/UL
RBC # FLD: 14.6 %
SODIUM SERPL-SCNC: 136 MMOL/L
TRIGL SERPL-MCNC: 62 MG/DL
TSH SERPL-ACNC: 2.7 UIU/ML
WBC # FLD AUTO: 4.72 K/UL

## 2019-11-14 ENCOUNTER — APPOINTMENT (OUTPATIENT)
Dept: SURGICAL ONCOLOGY | Facility: CLINIC | Age: 84
End: 2019-11-14

## 2019-11-22 ENCOUNTER — RX RENEWAL (OUTPATIENT)
Age: 84
End: 2019-11-22

## 2019-11-25 ENCOUNTER — RX RENEWAL (OUTPATIENT)
Age: 84
End: 2019-11-25

## 2019-11-25 ENCOUNTER — APPOINTMENT (OUTPATIENT)
Dept: SURGICAL ONCOLOGY | Facility: CLINIC | Age: 84
End: 2019-11-25
Payer: MEDICARE

## 2019-11-25 VITALS
RESPIRATION RATE: 15 BRPM | HEIGHT: 63 IN | BODY MASS INDEX: 31.89 KG/M2 | DIASTOLIC BLOOD PRESSURE: 86 MMHG | HEART RATE: 62 BPM | WEIGHT: 180 LBS | SYSTOLIC BLOOD PRESSURE: 124 MMHG

## 2019-11-25 PROCEDURE — 99214 OFFICE O/P EST MOD 30 MIN: CPT

## 2019-11-25 NOTE — ASSESSMENT
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

## 2019-11-25 NOTE — HISTORY OF PRESENT ILLNESS
[de-identified] : Alee is an 89 year old female who presents to the office for breast cancer follow up visit.  .  \par \par Past Hx:\par RIGHT breast cancer; s/p RIGHT lumpectomy with sentinel lymph node biopsy in 4/2012, path revealed T1N0 invasive ductal carcinoma, ER/AZ+, HER2-; s/p radiation therapy. Patient is currently on Arimidex 1 mg PO daily with no complaints. \par Oncotype Dx= 1\par \par She was last seen in April 2019 at which time she had recently noted a palpable lump in the right breast 10:00.  She underwent an ultrasound guided biopsy with benign findings. \par \par Bilateral mammogram and sonogram in May 2019 demonstrated benign findings and nothing was seen in the area of palpable concern (BIRADS 2). \par \par Labs 5/2019:  (Pt. states she had BW done this past Friday) \par CA 27-29: 20 U/ml\par CEA: <0.9 ng/ml\par LFTs WNL\par \par CMP 11/2019- WNL\par \par Denies palpable breast masses or nipple discharge. \par \par At her last visit on 9/10/19, she was with c/o a few week history of a lump involving her left forearm which has increased in size over time.  There was associated discomfort at times.  She denied any recent local trauma.   We referred the patient for a left upper extremity ultrasound on 9/17/19 which showed moderate complex bicipitoradial bursitis corresponding to the area of palpable concern.  There was severe insertional bicep tendinosis without definite full thickness tear.   She underwent a left arm MRI on the same day showing severe insertional biceps tendinosis with partial tear and moderate- large bicipitoradial bursitis.  She was referred to Ortho who recommended PT.   Patient is currently undergoing physical therapy with improvement. \par \par A-fib and is currently on Xarelto.  Started on Metformin for diabetes. \par \par Internist: Dr. Do

## 2019-11-25 NOTE — PHYSICAL EXAM
[Normal] : supple, no neck mass and thyroid not enlarged [Normal Supraclavicular Lymph Nodes] : normal supraclavicular lymph nodes [Normal Axillary Lymph Nodes] : normal axillary lymph nodes [Normal] : oriented to person, place and time, with appropriate affect [de-identified] : irregular rate and rhythm  [de-identified] : no masses or nipple discharge

## 2019-12-03 ENCOUNTER — RX RENEWAL (OUTPATIENT)
Age: 84
End: 2019-12-03

## 2020-01-08 ENCOUNTER — RX RENEWAL (OUTPATIENT)
Age: 85
End: 2020-01-08

## 2020-03-13 ENCOUNTER — APPOINTMENT (OUTPATIENT)
Dept: CARDIOLOGY | Facility: CLINIC | Age: 85
End: 2020-03-13

## 2020-03-30 ENCOUNTER — RX RENEWAL (OUTPATIENT)
Age: 85
End: 2020-03-30

## 2020-03-30 ENCOUNTER — APPOINTMENT (OUTPATIENT)
Dept: CARDIOLOGY | Facility: CLINIC | Age: 85
End: 2020-03-30

## 2020-05-16 ENCOUNTER — RX RENEWAL (OUTPATIENT)
Age: 85
End: 2020-05-16

## 2020-05-19 ENCOUNTER — RX RENEWAL (OUTPATIENT)
Age: 85
End: 2020-05-19

## 2020-05-26 ENCOUNTER — NON-APPOINTMENT (OUTPATIENT)
Age: 85
End: 2020-05-26

## 2020-05-26 ENCOUNTER — APPOINTMENT (OUTPATIENT)
Dept: CARDIOLOGY | Facility: CLINIC | Age: 85
End: 2020-05-26
Payer: MEDICARE

## 2020-05-26 VITALS
SYSTOLIC BLOOD PRESSURE: 149 MMHG | HEIGHT: 63 IN | TEMPERATURE: 97.7 F | OXYGEN SATURATION: 95 % | BODY MASS INDEX: 31.01 KG/M2 | HEART RATE: 69 BPM | WEIGHT: 175 LBS | DIASTOLIC BLOOD PRESSURE: 78 MMHG

## 2020-05-26 VITALS — SYSTOLIC BLOOD PRESSURE: 140 MMHG | DIASTOLIC BLOOD PRESSURE: 80 MMHG | HEART RATE: 56 BPM

## 2020-05-26 PROCEDURE — 36415 COLL VENOUS BLD VENIPUNCTURE: CPT

## 2020-05-26 PROCEDURE — 99214 OFFICE O/P EST MOD 30 MIN: CPT

## 2020-05-26 PROCEDURE — 93000 ELECTROCARDIOGRAM COMPLETE: CPT

## 2020-05-26 NOTE — REVIEW OF SYSTEMS
[Recent Weight Gain (___ Lbs)] : no recent weight gain [Recent Weight Loss (___ Lbs)] : recent [unfilled] ~Ulb weight loss [Feeling Fatigued] : not feeling fatigued [Shortness Of Breath] : no shortness of breath [Chest  Pressure] : no chest pressure [Dyspnea on exertion] : not dyspnea during exertion [Lower Ext Edema] : no extremity edema [Chest Pain] : no chest pain [Leg Claudication] : no intermittent leg claudication [Palpitations] : no palpitations [Abdominal Pain] : no abdominal pain [Heartburn] : no heartburn [Change In The Stool] : no change in stool [Joint Pain] : joint pain [see HPI] : see HPI [Joint Stiffness] : joint stiffness [Dizziness] : no dizziness [Numbness (Hypesthesia)] : numbness [Depression] : depression [Under Stress] : under stress [Suicidal] : not suicidal [Negative] : Heme/Lymph

## 2020-05-26 NOTE — HISTORY OF PRESENT ILLNESS
[FreeTextEntry1] : (MED HX) Patient with known coronary disease with stents to her circumflex artery and obtuse marginal going back to 2006 and 2008. Normal stress thallium in 2011 before surgery. Symptoms of esophageal reflux on and off that have been different than her angina. However in August, 2012 she had a return of her usual angina which initially responded to bisoprolol but then her symptoms increased and the patient called me on September 7. She was scheduled for a cardiac catheterization on September 10 and found to have a subtotal occlusion of her circumflex. This was treated with a drug-eluting stent and she was sent home on the 11th on aspirin and Plavix and she came  for followup 9/28/12. Since being home she has not had any of those same symptoms and is able to walk up the stairs now without shortness of breath or chest pain.\par She was here in January, 2013 for clearance for cataract surgery. She had no complications and was doing well since then other than having trouble maintaining her weight as she goes to [employer] and her  is able to eat cake and other foods without gaining any weight. She denies chest pain or shortness of breath with exertion, although when she lies down in bed she says she feels short of breath briefly.\par August 19, 2013. Here for followup and has no real complaints. She did have some pains in the legs that have been getting better with physical therapy. She denies exertional chest pain or shortness of breath. She is still having trouble losing weight. All seemed acceptable. She had labs with Dr. Do in October. BUN was slightly elevated and the sodium was 131 so he was asking me about cutting back on the diuretic. In addition she was anemic with an MCV of 80.\par January 15, 2014. The patient is here for followup. Her main issue is that she may need carpal tunnel surgery on both sides. She has some atypical pain in the upper left chest and neck area that is nonexertional and sounds more arthritic or musculoskeletal. She denies chest pain or shortness of breath. She was pleased to find out that based on the scale here she has lost 9 pounds.\par Tatiana 10, 2014. The patient is here for followup. She didn't need the carpal tunnel surgery but instead responded well to a steroid injection. However she developed bursitis in her right shoulder and had a steroid shot that just lasted for about a month. Now she is finishing up with physical therapy. Her main complaint is that she just cannot walk like she used to; she just kind of hits a wall and has to slow down. She denies exertional chest pain or shortness of breath and no definite leg pain but thinks it could be her arthritis. She claims her weight is about the same. She has had no change in her medications recently.\par The patient saw Dr. Geronimo in September for preop evaluation and she underwent carpal tunnel surgery on October 2. No complications and she saw Dr. Jaylan Christianson in followup.\par November 14, 2014. The patient is here in followup. When she lies down at night she gets pressure in her chest which feels similar to her previous symptoms and she was worried she might need another stent however she tries to see what happens when she walks up and down the stairs and has absolutely no symptoms whatsoever. Upon further questioning it seems that she was told to go back on the aspirin in addition to the Plavix by the presurgical testing people before the carpal tunnel surgery and it is only since being back on the aspirin that she has these symptoms. There is no shortness of breath. She is having trouble losing weight. I felt fairly certain that her symptoms were reflux and related to the aspirin. The aspirin was stopped and the AcipHex was increased to b.i.d.\par March 23, 2015. Patient is here for followup. On the whole she has been doing well since her last visit without recurrence of her chest pain or shortness of breath. However about 2-3 months ago she had a syncopal episode when she got up from the table and before she realized it she was on the floor. When she came to she was a little shaky but otherwise was okay. No recurrence. The next day she was in pain in her joints and was worried about her knee so  she was checked out by orthopedics and everything was fine. She has had no orthostatic symptoms since then and no lightheadedness or near syncope since then. She is on a diuretic every other day and she does not recall being ill around that time or any reason why she might have been dehydrated. She is on 5 mg of bisoprolol but has had no other episodes of syncope or near syncope in the last 2 months. Workup was unremarkable\par July 29, 2015. The patient is here in followup. She is complaining that her legs and arms feel heavy as if she's walking in sand or water. She is borderline hypotensive and it could just be because of the heat and volume depletion but the patient has not lost any weight and she claims that the symptoms have gone on for a few months not just recently when it's been hot. She denies chest pain or exertional shortness of breath. She has been having some trouble sleeping as well. Her EKG is totally within normal limits. I elected to hold her valsartan and then check labs for kidney function looking for volume depletion as to whether we should cut back on the diuretic more than just every other day. The possibility includes whether these are side effects of statin. No recurrence of her syncope. \par November 12, 2015. The patient is here for followup. She is now off Diovan because of her orthostatic symptoms and these seemed to have improved. She has no specific complaints. She is asking about aspirin versus Plavix, and she has remained just on Plavix most of this time. She does have esophageal reflux and is under treatment and under control. She saw Dr. Olmos in followup for her breast cancer and everything is fine and she is to see him again in 6 months. She denies exertional chest pain or shortness of breath. Her weight is still an issue.\par March 9, 2016. The patient is here for followup. Has been under a fair amount of stress as well as increased physical activity around the house with her 's intracerebral hemorrhage. He is improving greatly which has lessened her stress somewhat. She does not complain of chest pain or shortness of breath. She has an upcoming followup with oncologist and that'll be four years so far since her cancer surgery.\par June 16, 2016. Patient is here for followup with a little stress as her  is doing a little better, but she's been having episodes of trouble with her balance. No vertigo. No lightheadedness, syncope, or near syncope. She can't even feel unbalanced while sitting. Usually can last an hour but can function somewhat. Happens about once a week. Does not seem to be related to food or medication.\par October 13, 2016. Patient is here to follow up with her . They celebrated their 65th wedding anniversary. She is doing about the same, with some stress. Had a workup fpr vertebrobasilar insufficiency. She has numbness in both feet, but not in her hands and she will be having an EMG coming up soon.\par January 10, 2017. Patient here for followup. Not feeling well for about a month or so. Has chest pain and back pain when she takes a deep breath, but not with exertion. Needs laser surgery on her eyes complains about her body from feet to her head etc. Never did start the B12 injections that Dr. Rosenstein wanted. She is worried she might need another stent but again no exertional component.\par May 16, 2017. Patient here in followup. No chest pain or shortness of breath. Tripped on the carpet and got an infection in the knee, where she had her knee replacement, but she was able to treated with oral antibiotics and is just about finished. Her weight is up she thinks. She is very concerned about her , who seems to be a little bit more unbalanced and has a little bit more memory difficulties secondary to his intracerebral hemorrhage surgery and obviously getting older as well. No change in any of her medication.\par September 12, 2017. Patient is here in followup. She has been diagnosed with a peripheral neuropathy and is getting B12 injections. Her legs get numb when she flexes them up towards herself. Meanwhile, she is not walking much, so she is gaining a lot of weight. No chest pain, no shortness of breath, but is not as active as she used to be.\par January 9, 2018. The patient is here in followup. She had a six-month breast cancer followup in September, and everything checked out okay. She denies any increase in exertional chest pain or shortness of breath. There is a fair amount of stress from her 's memory and her 22-year-old granddaughter who is living with them. No change in medications.\par May 8, 2018. Patient here in followup. Doing well except gaining weight, not losing. Hed CAT scan showing coronary artery calcifications, and she was very concerned about the report, but I explained it is a normal finding. At this age and does not necessarily correlate with coronary artery disease. The report also mentioned enlarged heart and mitral valve calcification. No recent echo. Here her blood pressure is excellent. EKG unchanged.\par May 14, 2018. Returned for echocardiogram. Moderate MR. Mildly calcified aortic valve. Mild LAE. Normal LV function with concentric remodeling and mild diastolic dysfunction. Mild SYDNEE, with normal RV size and function. Moderate to severe TR with RVSP 44.\par September 13, 2018. Patient here to followup with her . No real complaints. Still, dyspnea on exertion going up stairs, but no chest pressure, and no change. VPCs on EKG, and on exam. Asymptomatic.\par October 29, 2018. On the 19th patient went to GI for colitis type complaints and was found to be in rapid A. fib. Sent to ER at [facility]. Converted with Cardizem and was started on Xarelto and discharged. I spoke with her on the 24th and arranged to see her today. In the meantime over the weekend she was readmitted to [facility] because of palpitations although not clear that she was in atrial fibrillation. She is here today in sinus rhythm with very frequent APCs. Quadrigeminy. For some reason she stopped her other medications, including her beta blocker her diuretic her statin and Plavix. She is also on metronidazole and Cipro because of her colitis.\par November 19, 2018. Patient is here in followup and for a clearance for a sigmoid biopsy and colonoscopy by Dr. Jonny Connor. The patient remains in sinus rhythm on sotalol. She is asymptomatic. She is somewhat nervous about the anesthesia and procedure. It is scheduled for November 29.She had been on 15 mg of Xarelto from the hospital, but based on her kidney function should be on 20 mg. If she stops the anticoagulation she does need to take baby aspirin because of her cardiac stent.\par January 18, 2019. Patient here in followup. Had a chest x-ray around January 4 and was nervous because he reports that prominent pulmonary arteries, possible pulmonary hypertension. Patient is here and remains in sinus rhythm. No signs of pulmonary hypertension on her EKG. (Echo in May, 2018 did have moderate TR with RVSP 44.) A little depressed over her macular degeneration and now she can't drive etc. otherwise no cardiac complaints. Weight is stable.\par July 26, 2019. Patient here in followup. Remains in sinus rhythm. Saw Dr. Rosenstein for workup of her peripheral neuropathy. He told her she was diabetic, although her hemoglobin A1c was 6.4 in June, which is where has been her last 3 visits here. In fact, she stopped eating cake and now has lost 12 pounds. By the same token, she could have a diabetic neuropathy, even before the full blown diagnosis of diabetes. In any case, she seems to be fairly stable, although a lot of stress because of her , as well as her own macular degeneration. She is also now on B12 shots. Her labs were also notable for a weekly migrating paraprotein that could not be characterized and homogenous JESSE at 1:320.\par November 8, 2019.  Patient here in follow-up.  "I am diabetic now".  She is on metformin 1000 and day and tolerating it.  She cut out cake and cookies and has lost 6 pounds since July and 18 pounds since January.  Only stress is regarding her .  Had a lump in her arm that turned out to be a biceps tear and she may be going to physical therapy.  Had a flu shot already.\par March 30, 2020.  Patient awoke with atypical left arm and shoulder pain and maybe some chest pain. Different from her angina and she thinks maybe she just slept wrong. But while thinking about it and being nervous about it she had palpitations that lasted for maybe 15 to 20 minutes. She did not have shortness of breath dizziness or worsening chest pain with that. The palpitations are gone. The patient was given an appointment to come in later today but she is very scared about the coronavirus and anxious about coming in. Currently her arm pain has resolved as well. After long discussion we elected to have her stay home and relax and only come in if the symptoms recur.  \par May 26, 2020.  Patient here in follow-up.  No more of the arm pain mentioned above.  Doing well overall just hard to deal with the stress of COVID-19 and her 's mild but progressing dementia.  Denies chest pain shortness of breath palpitations.  A little orthostatic lightheadedness.  EKG is sinus rhythm at 62 and unremarkable and unchanged

## 2020-05-26 NOTE — PHYSICAL EXAM
[General Appearance - Well Developed] : well developed [Normal Appearance] : normal appearance [Well Groomed] : well groomed [General Appearance - Well Nourished] : well nourished [No Deformities] : no deformities [General Appearance - In No Acute Distress] : no acute distress [Normal Conjunctiva] : the conjunctiva exhibited no abnormalities [Eyelids - No Xanthelasma] : the eyelids demonstrated no xanthelasmas [Normal Oral Mucosa] : normal oral mucosa [No Oral Pallor] : no oral pallor [No Oral Cyanosis] : no oral cyanosis [Normal Jugular Venous A Waves Present] : normal jugular venous A waves present [Normal Jugular Venous V Waves Present] : normal jugular venous V waves present [No Jugular Venous Epps A Waves] : no jugular venous epps A waves [Respiration, Rhythm And Depth] : normal respiratory rhythm and effort [Exaggerated Use Of Accessory Muscles For Inspiration] : no accessory muscle use [Auscultation Breath Sounds / Voice Sounds] : lungs were clear to auscultation bilaterally [Heart Rate And Rhythm] : heart rate and rhythm were normal [Murmurs] : no murmurs present [Heart Sounds] : normal S1 and S2 [Abdomen Soft] : soft [Abdomen Tenderness] : non-tender [Abdomen Mass (___ Cm)] : no abdominal mass palpated [Abnormal Walk] : normal gait [Gait - Sufficient For Exercise Testing] : the gait was sufficient for exercise testing [Nail Clubbing] : no clubbing of the fingernails [Cyanosis, Localized] : no localized cyanosis [Petechial Hemorrhages (___cm)] : no petechial hemorrhages [FreeTextEntry1] : No ankle edema (just "fat" ankles). Pedal pulses intact. Pulses symmetric, and no pulse deficits [Skin Color & Pigmentation] : normal skin color and pigmentation [] : no rash [No Venous Stasis] : no venous stasis [Skin Lesions] : no skin lesions [No Skin Ulcers] : no skin ulcer [No Xanthoma] : no  xanthoma was observed [Oriented To Time, Place, And Person] : oriented to person, place, and time [Affect] : the affect was normal [Mood] : the mood was normal [No Anxiety] : not feeling anxious

## 2020-05-27 LAB
ALBUMIN SERPL ELPH-MCNC: 4.2 G/DL
ALP BLD-CCNC: 69 U/L
ALT SERPL-CCNC: 9 U/L
ANION GAP SERPL CALC-SCNC: 13 MMOL/L
AST SERPL-CCNC: 15 U/L
BASOPHILS # BLD AUTO: 0.03 K/UL
BASOPHILS NFR BLD AUTO: 0.6 %
BILIRUB SERPL-MCNC: 0.4 MG/DL
BUN SERPL-MCNC: 22 MG/DL
CALCIUM SERPL-MCNC: 9.9 MG/DL
CHLORIDE SERPL-SCNC: 99 MMOL/L
CHOLEST SERPL-MCNC: 123 MG/DL
CHOLEST/HDLC SERPL: 2.2 RATIO
CO2 SERPL-SCNC: 24 MMOL/L
CREAT SERPL-MCNC: 0.73 MG/DL
EOSINOPHIL # BLD AUTO: 0.04 K/UL
EOSINOPHIL NFR BLD AUTO: 0.7 %
ESTIMATED AVERAGE GLUCOSE: 126 MG/DL
GLUCOSE SERPL-MCNC: 93 MG/DL
HBA1C MFR BLD HPLC: 6 %
HCT VFR BLD CALC: 35.5 %
HDLC SERPL-MCNC: 55 MG/DL
HGB BLD-MCNC: 11.4 G/DL
IMM GRANULOCYTES NFR BLD AUTO: 0.4 %
LDLC SERPL CALC-MCNC: 57 MG/DL
LYMPHOCYTES # BLD AUTO: 1.65 K/UL
LYMPHOCYTES NFR BLD AUTO: 30.4 %
MAN DIFF?: NORMAL
MCHC RBC-ENTMCNC: 27.8 PG
MCHC RBC-ENTMCNC: 32.1 GM/DL
MCV RBC AUTO: 86.6 FL
MONOCYTES # BLD AUTO: 0.6 K/UL
MONOCYTES NFR BLD AUTO: 11.1 %
NEUTROPHILS # BLD AUTO: 3.08 K/UL
NEUTROPHILS NFR BLD AUTO: 56.8 %
NT-PROBNP SERPL-MCNC: 364 PG/ML
PLATELET # BLD AUTO: 229 K/UL
POTASSIUM SERPL-SCNC: 4.6 MMOL/L
PROT SERPL-MCNC: 6.5 G/DL
RBC # BLD: 4.1 M/UL
RBC # FLD: 14.8 %
SODIUM SERPL-SCNC: 136 MMOL/L
TRIGL SERPL-MCNC: 53 MG/DL
TSH SERPL-ACNC: 3.05 UIU/ML
VIT B12 SERPL-MCNC: 1299 PG/ML
WBC # FLD AUTO: 5.42 K/UL

## 2020-06-16 ENCOUNTER — RX RENEWAL (OUTPATIENT)
Age: 85
End: 2020-06-16

## 2020-06-29 ENCOUNTER — RX RENEWAL (OUTPATIENT)
Age: 85
End: 2020-06-29

## 2020-06-29 ENCOUNTER — APPOINTMENT (OUTPATIENT)
Dept: SURGICAL ONCOLOGY | Facility: CLINIC | Age: 85
End: 2020-06-29
Payer: MEDICARE

## 2020-06-29 VITALS
BODY MASS INDEX: 30.65 KG/M2 | WEIGHT: 173 LBS | OXYGEN SATURATION: 98 % | HEIGHT: 63 IN | RESPIRATION RATE: 15 BRPM | HEART RATE: 68 BPM | SYSTOLIC BLOOD PRESSURE: 119 MMHG | DIASTOLIC BLOOD PRESSURE: 67 MMHG

## 2020-06-29 VITALS — TEMPERATURE: 97.8 F

## 2020-06-29 PROCEDURE — 99214 OFFICE O/P EST MOD 30 MIN: CPT

## 2020-06-29 NOTE — ASSESSMENT
[FreeTextEntry1] : IMP\par right breast cancer - NE on Arimidex 1 mg daily\par \par Plan:\par RTO q 6 months with bloodwork\par Bloodwork now\par annual mammogram and sonogram ( next 6/21 )

## 2020-06-29 NOTE — HISTORY OF PRESENT ILLNESS
[de-identified] : This is  a 89 year   old patient presenting today for a breast exam and to discuss the results of her recent  breast imaging. Patient had a BL breast US and   BL breast Mammogram  last week at [facility] which was reportedly negative.\par \par PERTINENT HISTORY: \par RIGHT breast cancer; s/p RIGHT lumpectomy with sentinel lymph node biopsy in 4/2012, path revealed T1N0 invasive ductal carcinoma, ER/ND+, HER2-; s/p radiation therapy. Patient is currently on Arimidex 1 mg PO daily with no complaints. \par Oncotype Dx= 1\par At her last visit on 9/10/19, she was with c/o a few week history of a lump involving her left forearm which has increased in size over time.  She underwent a left arm MRI on the same day showing severe insertional biceps tendinosis with partial tear and moderate- large bicipitoradial bursitis. She was referred to Ortho who recommended PT.   \par A-fib and is currently on Xarelto. Started on Metformin for diabetes. \par \par Internist: Dr. Do. \par

## 2020-06-29 NOTE — PHYSICAL EXAM
[Normal] : supple, no neck mass and thyroid not enlarged [Normal Supraclavicular Lymph Nodes] : normal supraclavicular lymph nodes [Normal Neck Lymph Nodes] : normal neck lymph nodes  [Normal Axillary Lymph Nodes] : normal axillary lymph nodes [de-identified] : right breast distortionfrom lumpectomy, but no masses or nipple discharge [Normal] : oriented to person, place and time, with appropriate affect

## 2020-08-10 ENCOUNTER — RX RENEWAL (OUTPATIENT)
Age: 85
End: 2020-08-10

## 2020-08-30 ENCOUNTER — RX RENEWAL (OUTPATIENT)
Age: 85
End: 2020-08-30

## 2020-09-15 ENCOUNTER — RX RENEWAL (OUTPATIENT)
Age: 85
End: 2020-09-15

## 2020-09-23 ENCOUNTER — NON-APPOINTMENT (OUTPATIENT)
Age: 85
End: 2020-09-23

## 2020-09-23 ENCOUNTER — APPOINTMENT (OUTPATIENT)
Dept: CARDIOLOGY | Facility: CLINIC | Age: 85
End: 2020-09-23
Payer: MEDICARE

## 2020-09-23 VITALS
SYSTOLIC BLOOD PRESSURE: 116 MMHG | OXYGEN SATURATION: 98 % | DIASTOLIC BLOOD PRESSURE: 63 MMHG | HEART RATE: 62 BPM | BODY MASS INDEX: 31.53 KG/M2 | WEIGHT: 178 LBS

## 2020-09-23 DIAGNOSIS — Z23 ENCOUNTER FOR IMMUNIZATION: ICD-10-CM

## 2020-09-23 PROCEDURE — 90662 IIV NO PRSV INCREASED AG IM: CPT

## 2020-09-23 PROCEDURE — 36415 COLL VENOUS BLD VENIPUNCTURE: CPT

## 2020-09-23 PROCEDURE — G0008: CPT

## 2020-09-23 PROCEDURE — 99214 OFFICE O/P EST MOD 30 MIN: CPT | Mod: 25

## 2020-09-23 PROCEDURE — 93306 TTE W/DOPPLER COMPLETE: CPT

## 2020-09-23 PROCEDURE — 93000 ELECTROCARDIOGRAM COMPLETE: CPT

## 2020-09-23 NOTE — REVIEW OF SYSTEMS
[Recent Weight Gain (___ Lbs)] : recent [unfilled] ~Ulb weight gain [Feeling Fatigued] : not feeling fatigued [Recent Weight Loss (___ Lbs)] : no recent weight loss [Shortness Of Breath] : no shortness of breath [Dyspnea on exertion] : not dyspnea during exertion [Chest  Pressure] : no chest pressure [Chest Pain] : no chest pain [Lower Ext Edema] : no extremity edema [Leg Claudication] : no intermittent leg claudication [Palpitations] : no palpitations [Abdominal Pain] : no abdominal pain [Heartburn] : no heartburn [Change In The Stool] : no change in stool [Joint Pain] : joint pain [Joint Stiffness] : joint stiffness [see HPI] : see HPI [Dizziness] : no dizziness [Numbness (Hypesthesia)] : numbness [Depression] : depression [Under Stress] : under stress [Suicidal] : not suicidal [Negative] : Heme/Lymph

## 2020-09-23 NOTE — HISTORY OF PRESENT ILLNESS
[FreeTextEntry1] : (MED HX) Patient with known coronary disease with stents to her circumflex artery and obtuse marginal going back to 2006 and 2008. Normal stress thallium in 2011 before surgery. Symptoms of esophageal reflux on and off that have been different than her angina. However in August, 2012 she had a return of her usual angina which initially responded to bisoprolol but then her symptoms increased and the patient called me on September 7. She was scheduled for a cardiac catheterization on September 10 and found to have a subtotal occlusion of her circumflex. This was treated with a drug-eluting stent and she was sent home on the 11th on aspirin and Plavix and she came  for followup 9/28/12. Since being home she has not had any of those same symptoms and is able to walk up the stairs now without shortness of breath or chest pain.\par She was here in January, 2013 for clearance for cataract surgery. She had no complications and was doing well since then other than having trouble maintaining her weight as she goes to [employer] and her  is able to eat cake and other foods without gaining any weight. She denies chest pain or shortness of breath with exertion, although when she lies down in bed she says she feels short of breath briefly.\par August 19, 2013. Here for followup and has no real complaints. She did have some pains in the legs that have been getting better with physical therapy. She denies exertional chest pain or shortness of breath. She is still having trouble losing weight. All seemed acceptable. She had labs with Dr. Do in October. BUN was slightly elevated and the sodium was 131 so he was asking me about cutting back on the diuretic. In addition she was anemic with an MCV of 80.\par January 15, 2014. The patient is here for followup. Her main issue is that she may need carpal tunnel surgery on both sides. She has some atypical pain in the upper left chest and neck area that is nonexertional and sounds more arthritic or musculoskeletal. She denies chest pain or shortness of breath. She was pleased to find out that based on the scale here she has lost 9 pounds.\par Tatiana 10, 2014. The patient is here for followup. She didn't need the carpal tunnel surgery but instead responded well to a steroid injection. However she developed bursitis in her right shoulder and had a steroid shot that just lasted for about a month. Now she is finishing up with physical therapy. Her main complaint is that she just cannot walk like she used to; she just kind of hits a wall and has to slow down. She denies exertional chest pain or shortness of breath and no definite leg pain but thinks it could be her arthritis. She claims her weight is about the same. She has had no change in her medications recently.\par The patient saw Dr. Geronimo in September for preop evaluation and she underwent carpal tunnel surgery on October 2. No complications and she saw Dr. Jaylan Christianson in followup.\par November 14, 2014. The patient is here in followup. When she lies down at night she gets pressure in her chest which feels similar to her previous symptoms and she was worried she might need another stent however she tries to see what happens when she walks up and down the stairs and has absolutely no symptoms whatsoever. Upon further questioning it seems that she was told to go back on the aspirin in addition to the Plavix by the presurgical testing people before the carpal tunnel surgery and it is only since being back on the aspirin that she has these symptoms. There is no shortness of breath. She is having trouble losing weight. I felt fairly certain that her symptoms were reflux and related to the aspirin. The aspirin was stopped and the AcipHex was increased to b.i.d.\par March 23, 2015. Patient is here for followup. On the whole she has been doing well since her last visit without recurrence of her chest pain or shortness of breath. However about 2-3 months ago she had a syncopal episode when she got up from the table and before she realized it she was on the floor. When she came to she was a little shaky but otherwise was okay. No recurrence. The next day she was in pain in her joints and was worried about her knee so  she was checked out by orthopedics and everything was fine. She has had no orthostatic symptoms since then and no lightheadedness or near syncope since then. She is on a diuretic every other day and she does not recall being ill around that time or any reason why she might have been dehydrated. She is on 5 mg of bisoprolol but has had no other episodes of syncope or near syncope in the last 2 months. Workup was unremarkable\par July 29, 2015. The patient is here in followup. She is complaining that her legs and arms feel heavy as if she's walking in sand or water. She is borderline hypotensive and it could just be because of the heat and volume depletion but the patient has not lost any weight and she claims that the symptoms have gone on for a few months not just recently when it's been hot. She denies chest pain or exertional shortness of breath. She has been having some trouble sleeping as well. Her EKG is totally within normal limits. I elected to hold her valsartan and then check labs for kidney function looking for volume depletion as to whether we should cut back on the diuretic more than just every other day. The possibility includes whether these are side effects of statin. No recurrence of her syncope. \par November 12, 2015. The patient is here for followup. She is now off Diovan because of her orthostatic symptoms and these seemed to have improved. She has no specific complaints. She is asking about aspirin versus Plavix, and she has remained just on Plavix most of this time. She does have esophageal reflux and is under treatment and under control. She saw Dr. Olmos in followup for her breast cancer and everything is fine and she is to see him again in 6 months. She denies exertional chest pain or shortness of breath. Her weight is still an issue.\par March 9, 2016. The patient is here for followup. Has been under a fair amount of stress as well as increased physical activity around the house with her 's intracerebral hemorrhage. He is improving greatly which has lessened her stress somewhat. She does not complain of chest pain or shortness of breath. She has an upcoming followup with oncologist and that'll be four years so far since her cancer surgery.\par June 16, 2016. Patient is here for followup with a little stress as her  is doing a little better, but she's been having episodes of trouble with her balance. No vertigo. No lightheadedness, syncope, or near syncope. She can't even feel unbalanced while sitting. Usually can last an hour but can function somewhat. Happens about once a week. Does not seem to be related to food or medication.\par October 13, 2016. Patient is here to follow up with her . They celebrated their 65th wedding anniversary. She is doing about the same, with some stress. Had a workup fpr vertebrobasilar insufficiency. She has numbness in both feet, but not in her hands and she will be having an EMG coming up soon.\par January 10, 2017. Patient here for followup. Not feeling well for about a month or so. Has chest pain and back pain when she takes a deep breath, but not with exertion. Needs laser surgery on her eyes complains about her body from feet to her head etc. Never did start the B12 injections that Dr. Rosenstein wanted. She is worried she might need another stent but again no exertional component.\par May 16, 2017. Patient here in followup. No chest pain or shortness of breath. Tripped on the carpet and got an infection in the knee, where she had her knee replacement, but she was able to treated with oral antibiotics and is just about finished. Her weight is up she thinks. She is very concerned about her , who seems to be a little bit more unbalanced and has a little bit more memory difficulties secondary to his intracerebral hemorrhage surgery and obviously getting older as well. No change in any of her medication.\par September 12, 2017. Patient is here in followup. She has been diagnosed with a peripheral neuropathy and is getting B12 injections. Her legs get numb when she flexes them up towards herself. Meanwhile, she is not walking much, so she is gaining a lot of weight. No chest pain, no shortness of breath, but is not as active as she used to be.\par January 9, 2018. The patient is here in followup. She had a six-month breast cancer followup in September, and everything checked out okay. She denies any increase in exertional chest pain or shortness of breath. There is a fair amount of stress from her 's memory and her 22-year-old granddaughter who is living with them. No change in medications.\par May 8, 2018. Patient here in followup. Doing well except gaining weight, not losing. Hed CAT scan showing coronary artery calcifications, and she was very concerned about the report, but I explained it is a normal finding. At this age and does not necessarily correlate with coronary artery disease. The report also mentioned enlarged heart and mitral valve calcification. No recent echo. Here her blood pressure is excellent. EKG unchanged.\par May 14, 2018. Returned for echocardiogram. Moderate MR. Mildly calcified aortic valve. Mild LAE. Normal LV function with concentric remodeling and mild diastolic dysfunction. Mild SYDNEE, with normal RV size and function. Moderate to severe TR with RVSP 44.\par September 13, 2018. Patient here to followup with her . No real complaints. Still, dyspnea on exertion going up stairs, but no chest pressure, and no change. VPCs on EKG, and on exam. Asymptomatic.\par October 29, 2018. On the 19th patient went to GI for colitis type complaints and was found to be in rapid A. fib. Sent to ER at [facility]. Converted with Cardizem and was started on Xarelto and discharged. I spoke with her on the 24th and arranged to see her today. In the meantime over the weekend she was readmitted to [facility] because of palpitations although not clear that she was in atrial fibrillation. She is here today in sinus rhythm with very frequent APCs. Quadrigeminy. For some reason she stopped her other medications, including her beta blocker her diuretic her statin and Plavix. She is also on metronidazole and Cipro because of her colitis.\par November 19, 2018. Patient is here in followup and for a clearance for a sigmoid biopsy and colonoscopy by Dr. Jonny Connor. The patient remains in sinus rhythm on sotalol. She is asymptomatic. She is somewhat nervous about the anesthesia and procedure. It is scheduled for November 29.She had been on 15 mg of Xarelto from the hospital, but based on her kidney function should be on 20 mg. If she stops the anticoagulation she does need to take baby aspirin because of her cardiac stent.\par January 18, 2019. Patient here in followup. Had a chest x-ray around January 4 and was nervous because he reports that prominent pulmonary arteries, possible pulmonary hypertension. Patient is here and remains in sinus rhythm. No signs of pulmonary hypertension on her EKG. (Echo in May, 2018 did have moderate TR with RVSP 44.) A little depressed over her macular degeneration and now she can't drive etc. otherwise no cardiac complaints. Weight is stable.\par July 26, 2019. Patient here in followup. Remains in sinus rhythm. Saw Dr. Rosenstein for workup of her peripheral neuropathy. He told her she was diabetic, although her hemoglobin A1c was 6.4 in June, which is where has been her last 3 visits here. In fact, she stopped eating cake and now has lost 12 pounds. By the same token, she could have a diabetic neuropathy, even before the full blown diagnosis of diabetes. In any case, she seems to be fairly stable, although a lot of stress because of her , as well as her own macular degeneration. She is also now on B12 shots. Her labs were also notable for a weekly migrating paraprotein that could not be characterized and homogenous JESSE at 1:320.\par November 8, 2019.  Patient here in follow-up.  "I am diabetic now".  She is on metformin 1000 and day and tolerating it.  She cut out cake and cookies and has lost 6 pounds since July and 18 pounds since January.  Only stress is regarding her .  Had a lump in her arm that turned out to be a biceps tear and she may be going to physical therapy.  Had a flu shot already.\par March 30, 2020.  Patient awoke with atypical left arm and shoulder pain and maybe some chest pain. Different from her angina and she thinks maybe she just slept wrong. But while thinking about it and being nervous about it she had palpitations that lasted for maybe 15 to 20 minutes. She did not have shortness of breath dizziness or worsening chest pain with that. The palpitations are gone. The patient was given an appointment to come in later today but she is very scared about the coronavirus and anxious about coming in. Currently her arm pain has resolved as well. After long discussion we elected to have her stay home and relax and only come in if the symptoms recur.  \par May 26, 2020.  Patient here in follow-up.  No more of the arm pain mentioned above.  Doing well overall just hard to deal with the stress of COVID-19 and her 's mild but progressing dementia.  Denies chest pain shortness of breath palpitations.  A little orthostatic lightheadedness.  EKG is sinus rhythm at 62 and unremarkable and unchanged.\par September 23, 2020.  Patient here in follow-up and for echocardiogram.  She turned 89.  She did gain 5 pounds.  Doing well but a lot of stress with her  because of his mild dementia.  Her echocardiogram is for the most part unchanged from 2 years ago.  She has normal LV function and she has only mild to moderate MR and moderate aortic stenosis and tricuspid regurgitation.  RVSP is 44 with normal RV function.

## 2020-09-25 LAB
ALBUMIN SERPL ELPH-MCNC: 4.4 G/DL
ALP BLD-CCNC: 72 U/L
ALT SERPL-CCNC: 7 U/L
ANION GAP SERPL CALC-SCNC: 9 MMOL/L
AST SERPL-CCNC: 17 U/L
BASOPHILS # BLD AUTO: 0.05 K/UL
BASOPHILS NFR BLD AUTO: 1 %
BILIRUB SERPL-MCNC: 0.3 MG/DL
BUN SERPL-MCNC: 21 MG/DL
CALCIUM SERPL-MCNC: 9.8 MG/DL
CHLORIDE SERPL-SCNC: 99 MMOL/L
CHOLEST SERPL-MCNC: 134 MG/DL
CHOLEST/HDLC SERPL: 2.4 RATIO
CK SERPL-CCNC: 116 U/L
CO2 SERPL-SCNC: 25 MMOL/L
CREAT SERPL-MCNC: 0.74 MG/DL
EOSINOPHIL # BLD AUTO: 0.06 K/UL
EOSINOPHIL NFR BLD AUTO: 1.2 %
ESTIMATED AVERAGE GLUCOSE: 128 MG/DL
GLUCOSE SERPL-MCNC: 100 MG/DL
HBA1C MFR BLD HPLC: 6.1 %
HCT VFR BLD CALC: 36.7 %
HDLC SERPL-MCNC: 56 MG/DL
HGB BLD-MCNC: 11.3 G/DL
IMM GRANULOCYTES NFR BLD AUTO: 0.6 %
LDLC SERPL CALC-MCNC: 65 MG/DL
LYMPHOCYTES # BLD AUTO: 1.81 K/UL
LYMPHOCYTES NFR BLD AUTO: 35.5 %
MAN DIFF?: NORMAL
MCHC RBC-ENTMCNC: 27.7 PG
MCHC RBC-ENTMCNC: 30.8 GM/DL
MCV RBC AUTO: 90 FL
MONOCYTES # BLD AUTO: 0.56 K/UL
MONOCYTES NFR BLD AUTO: 11 %
NEUTROPHILS # BLD AUTO: 2.59 K/UL
NEUTROPHILS NFR BLD AUTO: 50.7 %
NT-PROBNP SERPL-MCNC: 258 PG/ML
PLATELET # BLD AUTO: 221 K/UL
POTASSIUM SERPL-SCNC: 5.1 MMOL/L
PROT SERPL-MCNC: 6.5 G/DL
RBC # BLD: 4.08 M/UL
RBC # FLD: 14.9 %
SODIUM SERPL-SCNC: 134 MMOL/L
TRIGL SERPL-MCNC: 66 MG/DL
TSH SERPL-ACNC: 2.29 UIU/ML
VIT B12 SERPL-MCNC: 1133 PG/ML
WBC # FLD AUTO: 5.1 K/UL

## 2020-10-19 ENCOUNTER — RX RENEWAL (OUTPATIENT)
Age: 85
End: 2020-10-19

## 2020-12-13 ENCOUNTER — RX RENEWAL (OUTPATIENT)
Age: 85
End: 2020-12-13

## 2020-12-13 RX ORDER — ANASTROZOLE TABLETS 1 MG/1
1 TABLET ORAL DAILY
Qty: 90 | Refills: 3 | Status: ACTIVE | COMMUNITY
Start: 2017-12-08 | End: 1900-01-01

## 2021-01-04 ENCOUNTER — APPOINTMENT (OUTPATIENT)
Dept: SURGICAL ONCOLOGY | Facility: CLINIC | Age: 86
End: 2021-01-04
Payer: MEDICARE

## 2021-01-04 VITALS
RESPIRATION RATE: 16 BRPM | OXYGEN SATURATION: 97 % | HEART RATE: 60 BPM | SYSTOLIC BLOOD PRESSURE: 137 MMHG | HEIGHT: 62 IN | TEMPERATURE: 96.9 F | BODY MASS INDEX: 33.13 KG/M2 | DIASTOLIC BLOOD PRESSURE: 72 MMHG | WEIGHT: 180 LBS

## 2021-01-04 PROCEDURE — 99214 OFFICE O/P EST MOD 30 MIN: CPT

## 2021-01-04 RX ORDER — METFORMIN ER 500 MG 500 MG/1
500 TABLET ORAL
Qty: 180 | Refills: 0 | Status: ACTIVE | COMMUNITY
Start: 2020-10-12

## 2021-01-04 NOTE — ASSESSMENT
[FreeTextEntry1] : IMP\par Right breast cancer s/p lumpectomy 2012 - NE on Arimidex 1 mg daily\par \par Plan:\par RTO q 6 months with bloodwork\par Bloodwork now (Ordered)\par Annual mammogram and sonogram ( next 6/21 ) - (Ordered)

## 2021-01-04 NOTE — HISTORY OF PRESENT ILLNESS
[de-identified] : This is  a 89 year old patient presenting for a 6 month follow up visit. \par \par PERTINENT HISTORY: \par RIGHT breast cancer; s/p RIGHT lumpectomy with sentinel lymph node biopsy in 4/2012, path revealed T1N0 invasive ductal carcinoma, ER/MN+, HER2-;Oncotype Dx= 1, s/p radiation therapy. Patient is currently on Arimidex 1 mg PO daily with no complaints. \par \par At a visit on 9/10/19, she was with c/o a few week history of a lump involving her left forearm which has increased in size over time.  She underwent a left arm MRI on the same day showing severe insertional biceps tendinosis with partial tear and moderate- large bicipitoradial bursitis. She was referred to Ortho who recommended PT.     A-fib and is on Xarelto. On Metformin for diabetes. \par \par She completed a bilateral mammogram and sonogram in June 2020 with benign findings (BIRADS 2). \par \par No recent tumor markers. \par LFTs 9/2020- WNL\par \par Denies palpable breast masses or nipple discharge. \par \par Internist: Dr. Do. \par

## 2021-01-04 NOTE — PHYSICAL EXAM
[Normal] : supple, no neck mass and thyroid not enlarged [Normal Supraclavicular Lymph Nodes] : normal supraclavicular lymph nodes [Normal Axillary Lymph Nodes] : normal axillary lymph nodes [Normal] : oriented to person, place and time, with appropriate affect [de-identified] : irregular rate and rhythm  [de-identified] : right breast lumpectomy scar but no masses or nipple discharge

## 2021-01-05 ENCOUNTER — RX RENEWAL (OUTPATIENT)
Age: 86
End: 2021-01-05

## 2021-01-13 LAB
ALBUMIN SERPL ELPH-MCNC: 4.2 G/DL
ALP BLD-CCNC: 76 U/L
ALT SERPL-CCNC: 8 U/L
AST SERPL-CCNC: 15 U/L
BILIRUB DIRECT SERPL-MCNC: 0.1 MG/DL
BILIRUB INDIRECT SERPL-MCNC: 0.1 MG/DL
BILIRUB SERPL-MCNC: 0.2 MG/DL
CANCER AG27-29 SERPL-ACNC: 22.4 U/ML
CEA SERPL-MCNC: 1.1 NG/ML
PROT SERPL-MCNC: 6.4 G/DL

## 2021-01-26 ENCOUNTER — NON-APPOINTMENT (OUTPATIENT)
Age: 86
End: 2021-01-26

## 2021-01-26 ENCOUNTER — APPOINTMENT (OUTPATIENT)
Dept: CARDIOLOGY | Facility: CLINIC | Age: 86
End: 2021-01-26
Payer: MEDICARE

## 2021-01-26 VITALS
OXYGEN SATURATION: 94 % | HEIGHT: 62 IN | BODY MASS INDEX: 33.86 KG/M2 | TEMPERATURE: 97.9 F | HEART RATE: 62 BPM | SYSTOLIC BLOOD PRESSURE: 115 MMHG | DIASTOLIC BLOOD PRESSURE: 70 MMHG | WEIGHT: 184 LBS

## 2021-01-26 PROCEDURE — 99214 OFFICE O/P EST MOD 30 MIN: CPT

## 2021-01-26 PROCEDURE — 36415 COLL VENOUS BLD VENIPUNCTURE: CPT

## 2021-01-26 PROCEDURE — 93000 ELECTROCARDIOGRAM COMPLETE: CPT

## 2021-01-26 NOTE — REVIEW OF SYSTEMS
[Joint Pain] : joint pain [Joint Stiffness] : joint stiffness [see HPI] : see HPI [Numbness (Hypesthesia)] : numbness [Depression] : depression [Under Stress] : under stress [Negative] : Heme/Lymph [Recent Weight Gain (___ Lbs)] : recent [unfilled] ~Ulb weight gain [Feeling Fatigued] : not feeling fatigued [Recent Weight Loss (___ Lbs)] : no recent weight loss [Shortness Of Breath] : no shortness of breath [Dyspnea on exertion] : not dyspnea during exertion [Chest  Pressure] : no chest pressure [Chest Pain] : no chest pain [Lower Ext Edema] : no extremity edema [Leg Claudication] : no intermittent leg claudication [Palpitations] : no palpitations [Abdominal Pain] : no abdominal pain [Heartburn] : no heartburn [Change In The Stool] : no change in stool [Dizziness] : no dizziness [Suicidal] : not suicidal

## 2021-01-26 NOTE — HISTORY OF PRESENT ILLNESS
[FreeTextEntry1] : (MED HX) Patient with known coronary disease with stents to her circumflex artery and obtuse marginal going back to 2006 and 2008. Normal stress thallium in 2011 before surgery. Symptoms of esophageal reflux on and off that have been different than her angina. However in August, 2012 she had a return of her usual angina which initially responded to bisoprolol but then her symptoms increased and the patient called me on September 7. She was scheduled for a cardiac catheterization on September 10 and found to have a subtotal occlusion of her circumflex. This was treated with a drug-eluting stent and she was sent home on the 11th on aspirin and Plavix and she came  for followup 9/28/12. Since being home she has not had any of those same symptoms and is able to walk up the stairs now without shortness of breath or chest pain.\par She was here in January, 2013 for clearance for cataract surgery. She had no complications and was doing well since then other than having trouble maintaining her weight as she goes to [employer] and her  is able to eat cake and other foods without gaining any weight. She denies chest pain or shortness of breath with exertion, although when she lies down in bed she says she feels short of breath briefly.\par August 19, 2013. Here for followup and has no real complaints. She did have some pains in the legs that have been getting better with physical therapy. She denies exertional chest pain or shortness of breath. She is still having trouble losing weight. All seemed acceptable. She had labs with Dr. Do in October. BUN was slightly elevated and the sodium was 131 so he was asking me about cutting back on the diuretic. In addition she was anemic with an MCV of 80.\par January 15, 2014. The patient is here for followup. Her main issue is that she may need carpal tunnel surgery on both sides. She has some atypical pain in the upper left chest and neck area that is nonexertional and sounds more arthritic or musculoskeletal. She denies chest pain or shortness of breath. She was pleased to find out that based on the scale here she has lost 9 pounds.\par Tatiana 10, 2014. The patient is here for followup. She didn't need the carpal tunnel surgery but instead responded well to a steroid injection. However she developed bursitis in her right shoulder and had a steroid shot that just lasted for about a month. Now she is finishing up with physical therapy. Her main complaint is that she just cannot walk like she used to; she just kind of hits a wall and has to slow down. She denies exertional chest pain or shortness of breath and no definite leg pain but thinks it could be her arthritis. She claims her weight is about the same. She has had no change in her medications recently.\par The patient saw Dr. Geronimo in September for preop evaluation and she underwent carpal tunnel surgery on October 2. No complications and she saw Dr. Jaylan Christianson in followup.\par November 14, 2014. The patient is here in followup. When she lies down at night she gets pressure in her chest which feels similar to her previous symptoms and she was worried she might need another stent however she tries to see what happens when she walks up and down the stairs and has absolutely no symptoms whatsoever. Upon further questioning it seems that she was told to go back on the aspirin in addition to the Plavix by the presurgical testing people before the carpal tunnel surgery and it is only since being back on the aspirin that she has these symptoms. There is no shortness of breath. She is having trouble losing weight. I felt fairly certain that her symptoms were reflux and related to the aspirin. The aspirin was stopped and the AcipHex was increased to b.i.d.\par March 23, 2015. Patient is here for followup. On the whole she has been doing well since her last visit without recurrence of her chest pain or shortness of breath. However about 2-3 months ago she had a syncopal episode when she got up from the table and before she realized it she was on the floor. When she came to she was a little shaky but otherwise was okay. No recurrence. The next day she was in pain in her joints and was worried about her knee so  she was checked out by orthopedics and everything was fine. She has had no orthostatic symptoms since then and no lightheadedness or near syncope since then. She is on a diuretic every other day and she does not recall being ill around that time or any reason why she might have been dehydrated. She is on 5 mg of bisoprolol but has had no other episodes of syncope or near syncope in the last 2 months. Workup was unremarkable\par July 29, 2015. The patient is here in followup. She is complaining that her legs and arms feel heavy as if she's walking in sand or water. She is borderline hypotensive and it could just be because of the heat and volume depletion but the patient has not lost any weight and she claims that the symptoms have gone on for a few months not just recently when it's been hot. She denies chest pain or exertional shortness of breath. She has been having some trouble sleeping as well. Her EKG is totally within normal limits. I elected to hold her valsartan and then check labs for kidney function looking for volume depletion as to whether we should cut back on the diuretic more than just every other day. The possibility includes whether these are side effects of statin. No recurrence of her syncope. \par November 12, 2015. The patient is here for followup. She is now off Diovan because of her orthostatic symptoms and these seemed to have improved. She has no specific complaints. She is asking about aspirin versus Plavix, and she has remained just on Plavix most of this time. She does have esophageal reflux and is under treatment and under control. She saw Dr. Olmos in followup for her breast cancer and everything is fine and she is to see him again in 6 months. She denies exertional chest pain or shortness of breath. Her weight is still an issue.\par March 9, 2016. The patient is here for followup. Has been under a fair amount of stress as well as increased physical activity around the house with her 's intracerebral hemorrhage. He is improving greatly which has lessened her stress somewhat. She does not complain of chest pain or shortness of breath. She has an upcoming followup with oncologist and that'll be four years so far since her cancer surgery.\par June 16, 2016. Patient is here for followup with a little stress as her  is doing a little better, but she's been having episodes of trouble with her balance. No vertigo. No lightheadedness, syncope, or near syncope. She can't even feel unbalanced while sitting. Usually can last an hour but can function somewhat. Happens about once a week. Does not seem to be related to food or medication.\par October 13, 2016. Patient is here to follow up with her . They celebrated their 65th wedding anniversary. She is doing about the same, with some stress. Had a workup fpr vertebrobasilar insufficiency. She has numbness in both feet, but not in her hands and she will be having an EMG coming up soon.\par January 10, 2017. Patient here for followup. Not feeling well for about a month or so. Has chest pain and back pain when she takes a deep breath, but not with exertion. Needs laser surgery on her eyes complains about her body from feet to her head etc. Never did start the B12 injections that Dr. Rosenstein wanted. She is worried she might need another stent but again no exertional component.\par May 16, 2017. Patient here in followup. No chest pain or shortness of breath. Tripped on the carpet and got an infection in the knee, where she had her knee replacement, but she was able to treated with oral antibiotics and is just about finished. Her weight is up she thinks. She is very concerned about her , who seems to be a little bit more unbalanced and has a little bit more memory difficulties secondary to his intracerebral hemorrhage surgery and obviously getting older as well. No change in any of her medication.\par September 12, 2017. Patient is here in followup. She has been diagnosed with a peripheral neuropathy and is getting B12 injections. Her legs get numb when she flexes them up towards herself. Meanwhile, she is not walking much, so she is gaining a lot of weight. No chest pain, no shortness of breath, but is not as active as she used to be.\par January 9, 2018. The patient is here in followup. She had a six-month breast cancer followup in September, and everything checked out okay. She denies any increase in exertional chest pain or shortness of breath. There is a fair amount of stress from her 's memory and her 22-year-old granddaughter who is living with them. No change in medications.\par May 8, 2018. Patient here in followup. Doing well except gaining weight, not losing. Hed CAT scan showing coronary artery calcifications, and she was very concerned about the report, but I explained it is a normal finding. At this age and does not necessarily correlate with coronary artery disease. The report also mentioned enlarged heart and mitral valve calcification. No recent echo. Here her blood pressure is excellent. EKG unchanged.\par May 14, 2018. Returned for echocardiogram. Moderate MR. Mildly calcified aortic valve. Mild LAE. Normal LV function with concentric remodeling and mild diastolic dysfunction. Mild SYDNEE, with normal RV size and function. Moderate to severe TR with RVSP 44.\par September 13, 2018. Patient here to followup with her . No real complaints. Still, dyspnea on exertion going up stairs, but no chest pressure, and no change. VPCs on EKG, and on exam. Asymptomatic.\par October 29, 2018. On the 19th patient went to GI for colitis type complaints and was found to be in rapid A. fib. Sent to ER at [facility]. Converted with Cardizem and was started on Xarelto and discharged. I spoke with her on the 24th and arranged to see her today. In the meantime over the weekend she was readmitted to [facility] because of palpitations although not clear that she was in atrial fibrillation. She is here today in sinus rhythm with very frequent APCs. Quadrigeminy. For some reason she stopped her other medications, including her beta blocker her diuretic her statin and Plavix. She is also on metronidazole and Cipro because of her colitis.\par November 19, 2018. Patient is here in followup and for a clearance for a sigmoid biopsy and colonoscopy by Dr. Jonny Connor. The patient remains in sinus rhythm on sotalol. She is asymptomatic. She is somewhat nervous about the anesthesia and procedure. It is scheduled for November 29.She had been on 15 mg of Xarelto from the hospital, but based on her kidney function should be on 20 mg. If she stops the anticoagulation she does need to take baby aspirin because of her cardiac stent.\par January 18, 2019. Patient here in followup. Had a chest x-ray around January 4 and was nervous because he reports that prominent pulmonary arteries, possible pulmonary hypertension. Patient is here and remains in sinus rhythm. No signs of pulmonary hypertension on her EKG. (Echo in May, 2018 did have moderate TR with RVSP 44.) A little depressed over her macular degeneration and now she can't drive etc. otherwise no cardiac complaints. Weight is stable.\par July 26, 2019. Patient here in followup. Remains in sinus rhythm. Saw Dr. Rosenstein for workup of her peripheral neuropathy. He told her she was diabetic, although her hemoglobin A1c was 6.4 in June, which is where has been her last 3 visits here. In fact, she stopped eating cake and now has lost 12 pounds. By the same token, she could have a diabetic neuropathy, even before the full blown diagnosis of diabetes. In any case, she seems to be fairly stable, although a lot of stress because of her , as well as her own macular degeneration. She is also now on B12 shots. Her labs were also notable for a weekly migrating paraprotein that could not be characterized and homogenous JESSE at 1:320.\par November 8, 2019.  Patient here in follow-up.  "I am diabetic now".  She is on metformin 1000 and day and tolerating it.  She cut out cake and cookies and has lost 6 pounds since July and 18 pounds since January.  Only stress is regarding her .  Had a lump in her arm that turned out to be a biceps tear and she may be going to physical therapy.  Had a flu shot already.\par March 30, 2020.  Patient awoke with atypical left arm and shoulder pain and maybe some chest pain. Different from her angina and she thinks maybe she just slept wrong. But while thinking about it and being nervous about it she had palpitations that lasted for maybe 15 to 20 minutes. She did not have shortness of breath dizziness or worsening chest pain with that. The palpitations are gone. The patient was given an appointment to come in later today but she is very scared about the coronavirus and anxious about coming in. Currently her arm pain has resolved as well. After long discussion we elected to have her stay home and relax and only come in if the symptoms recur.  \par May 26, 2020.  Patient here in follow-up.  No more of the arm pain mentioned above.  Doing well overall just hard to deal with the stress of COVID-19 and her 's mild but progressing dementia.  Denies chest pain shortness of breath palpitations.  A little orthostatic lightheadedness.  EKG is sinus rhythm at 62 and unremarkable and unchanged.\par September 23, 2020.  Patient here in follow-up and for echocardiogram.  She turned 89.  She did gain 5 pounds.  Doing well but a lot of stress with her  because of his mild dementia.  Her echocardiogram is for the most part unchanged from 2 years ago.  She has normal LV function and she has only mild to moderate MR and moderate aortic stenosis and tricuspid regurgitation.  RVSP is 44 with normal RV function.\par January 26, 2021.  Patient here in follow-up.  Doing well although her  now hears voices.  She started eating some cake from family celebrations and noticed that her sugar the next morning was still normal so she kept on eating cake and has gained 6 pounds.  She also blames it on inactivity from Covid.  No cardiac symptoms.  EKG is sinus bradycardia and unchanged.  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

## 2021-01-27 LAB
ALBUMIN SERPL ELPH-MCNC: 4.3 G/DL
ALP BLD-CCNC: 75 U/L
ALT SERPL-CCNC: 9 U/L
ANION GAP SERPL CALC-SCNC: 14 MMOL/L
AST SERPL-CCNC: 13 U/L
BASOPHILS # BLD AUTO: 0.03 K/UL
BASOPHILS NFR BLD AUTO: 0.6 %
BILIRUB SERPL-MCNC: 0.2 MG/DL
BUN SERPL-MCNC: 22 MG/DL
CALCIUM SERPL-MCNC: 9.5 MG/DL
CHLORIDE SERPL-SCNC: 99 MMOL/L
CHOLEST SERPL-MCNC: 136 MG/DL
CO2 SERPL-SCNC: 23 MMOL/L
CREAT SERPL-MCNC: 0.85 MG/DL
EOSINOPHIL # BLD AUTO: 0.04 K/UL
EOSINOPHIL NFR BLD AUTO: 0.8 %
ESTIMATED AVERAGE GLUCOSE: 134 MG/DL
GLUCOSE SERPL-MCNC: 85 MG/DL
HBA1C MFR BLD HPLC: 6.3 %
HCT VFR BLD CALC: 37.4 %
HDLC SERPL-MCNC: 50 MG/DL
HGB BLD-MCNC: 11.7 G/DL
IMM GRANULOCYTES NFR BLD AUTO: 0.4 %
LDLC SERPL CALC-MCNC: 67 MG/DL
LYMPHOCYTES # BLD AUTO: 1.85 K/UL
LYMPHOCYTES NFR BLD AUTO: 35.4 %
MAN DIFF?: NORMAL
MCHC RBC-ENTMCNC: 27.4 PG
MCHC RBC-ENTMCNC: 31.3 GM/DL
MCV RBC AUTO: 87.6 FL
MONOCYTES # BLD AUTO: 0.64 K/UL
MONOCYTES NFR BLD AUTO: 12.2 %
NEUTROPHILS # BLD AUTO: 2.65 K/UL
NEUTROPHILS NFR BLD AUTO: 50.6 %
NONHDLC SERPL-MCNC: 86 MG/DL
NT-PROBNP SERPL-MCNC: 217 PG/ML
PLATELET # BLD AUTO: 249 K/UL
POTASSIUM SERPL-SCNC: 4.6 MMOL/L
PROT SERPL-MCNC: 6.5 G/DL
RBC # BLD: 4.27 M/UL
RBC # FLD: 14.7 %
SODIUM SERPL-SCNC: 135 MMOL/L
TRIGL SERPL-MCNC: 98 MG/DL
TSH SERPL-ACNC: 3.3 UIU/ML
VIT B12 SERPL-MCNC: 1143 PG/ML
WBC # FLD AUTO: 5.23 K/UL

## 2021-02-15 ENCOUNTER — RX RENEWAL (OUTPATIENT)
Age: 86
End: 2021-02-15

## 2021-05-11 ENCOUNTER — RX RENEWAL (OUTPATIENT)
Age: 86
End: 2021-05-11

## 2021-05-25 ENCOUNTER — NON-APPOINTMENT (OUTPATIENT)
Age: 86
End: 2021-05-25

## 2021-05-25 ENCOUNTER — APPOINTMENT (OUTPATIENT)
Dept: CARDIOLOGY | Facility: CLINIC | Age: 86
End: 2021-05-25
Payer: MEDICARE

## 2021-05-25 VITALS
SYSTOLIC BLOOD PRESSURE: 124 MMHG | BODY MASS INDEX: 33.29 KG/M2 | HEART RATE: 61 BPM | WEIGHT: 182 LBS | OXYGEN SATURATION: 98 % | DIASTOLIC BLOOD PRESSURE: 71 MMHG

## 2021-05-25 PROCEDURE — 99214 OFFICE O/P EST MOD 30 MIN: CPT

## 2021-05-25 PROCEDURE — 93000 ELECTROCARDIOGRAM COMPLETE: CPT

## 2021-05-25 PROCEDURE — 36415 COLL VENOUS BLD VENIPUNCTURE: CPT

## 2021-05-25 NOTE — HISTORY OF PRESENT ILLNESS
[FreeTextEntry1] : (MED HX) Patient with known coronary disease with stents to her circumflex artery and obtuse marginal going back to 2006 and 2008. Normal stress thallium in 2011 before surgery. Symptoms of esophageal reflux on and off that have been different than her angina. However in August, 2012 she had a return of her usual angina which initially responded to bisoprolol but then her symptoms increased and the patient called me on September 7. She was scheduled for a cardiac catheterization on September 10 and found to have a subtotal occlusion of her circumflex. This was treated with a drug-eluting stent and she was sent home on the 11th on aspirin and Plavix and she came  for followup 9/28/12. Since being home she has not had any of those same symptoms and is able to walk up the stairs now without shortness of breath or chest pain.\par She was here in January, 2013 for clearance for cataract surgery. She had no complications and was doing well since then other than having trouble maintaining her weight as she goes to [employer] and her  is able to eat cake and other foods without gaining any weight. She denies chest pain or shortness of breath with exertion, although when she lies down in bed she says she feels short of breath briefly.\par August 19, 2013. Here for followup and has no real complaints. She did have some pains in the legs that have been getting better with physical therapy. She denies exertional chest pain or shortness of breath. She is still having trouble losing weight. All seemed acceptable. She had labs with Dr. Do in October. BUN was slightly elevated and the sodium was 131 so he was asking me about cutting back on the diuretic. In addition she was anemic with an MCV of 80.\par January 15, 2014. The patient is here for followup. Her main issue is that she may need carpal tunnel surgery on both sides. She has some atypical pain in the upper left chest and neck area that is nonexertional and sounds more arthritic or musculoskeletal. She denies chest pain or shortness of breath. She was pleased to find out that based on the scale here she has lost 9 pounds.\par Tatiana 10, 2014. The patient is here for followup. She didn't need the carpal tunnel surgery but instead responded well to a steroid injection. However she developed bursitis in her right shoulder and had a steroid shot that just lasted for about a month. Now she is finishing up with physical therapy. Her main complaint is that she just cannot walk like she used to; she just kind of hits a wall and has to slow down. She denies exertional chest pain or shortness of breath and no definite leg pain but thinks it could be her arthritis. She claims her weight is about the same. She has had no change in her medications recently.\par The patient saw Dr. Geronimo in September for preop evaluation and she underwent carpal tunnel surgery on October 2. No complications and she saw Dr. Jaylan Christianson in followup.\par November 14, 2014. The patient is here in followup. When she lies down at night she gets pressure in her chest which feels similar to her previous symptoms and she was worried she might need another stent however she tries to see what happens when she walks up and down the stairs and has absolutely no symptoms whatsoever. Upon further questioning it seems that she was told to go back on the aspirin in addition to the Plavix by the presurgical testing people before the carpal tunnel surgery and it is only since being back on the aspirin that she has these symptoms. There is no shortness of breath. She is having trouble losing weight. I felt fairly certain that her symptoms were reflux and related to the aspirin. The aspirin was stopped and the AcipHex was increased to b.i.d.\par March 23, 2015. Patient is here for followup. On the whole she has been doing well since her last visit without recurrence of her chest pain or shortness of breath. However about 2-3 months ago she had a syncopal episode when she got up from the table and before she realized it she was on the floor. When she came to she was a little shaky but otherwise was okay. No recurrence. The next day she was in pain in her joints and was worried about her knee so  she was checked out by orthopedics and everything was fine. She has had no orthostatic symptoms since then and no lightheadedness or near syncope since then. She is on a diuretic every other day and she does not recall being ill around that time or any reason why she might have been dehydrated. She is on 5 mg of bisoprolol but has had no other episodes of syncope or near syncope in the last 2 months. Workup was unremarkable\par July 29, 2015. The patient is here in followup. She is complaining that her legs and arms feel heavy as if she's walking in sand or water. She is borderline hypotensive and it could just be because of the heat and volume depletion but the patient has not lost any weight and she claims that the symptoms have gone on for a few months not just recently when it's been hot. She denies chest pain or exertional shortness of breath. She has been having some trouble sleeping as well. Her EKG is totally within normal limits. I elected to hold her valsartan and then check labs for kidney function looking for volume depletion as to whether we should cut back on the diuretic more than just every other day. The possibility includes whether these are side effects of statin. No recurrence of her syncope. \par November 12, 2015. The patient is here for followup. She is now off Diovan because of her orthostatic symptoms and these seemed to have improved. She has no specific complaints. She is asking about aspirin versus Plavix, and she has remained just on Plavix most of this time. She does have esophageal reflux and is under treatment and under control. She saw Dr. Olmos in followup for her breast cancer and everything is fine and she is to see him again in 6 months. She denies exertional chest pain or shortness of breath. Her weight is still an issue.\par March 9, 2016. The patient is here for followup. Has been under a fair amount of stress as well as increased physical activity around the house with her 's intracerebral hemorrhage. He is improving greatly which has lessened her stress somewhat. She does not complain of chest pain or shortness of breath. She has an upcoming followup with oncologist and that'll be four years so far since her cancer surgery.\par June 16, 2016. Patient is here for followup with a little stress as her  is doing a little better, but she's been having episodes of trouble with her balance. No vertigo. No lightheadedness, syncope, or near syncope. She can't even feel unbalanced while sitting. Usually can last an hour but can function somewhat. Happens about once a week. Does not seem to be related to food or medication.\par October 13, 2016. Patient is here to follow up with her . They celebrated their 65th wedding anniversary. She is doing about the same, with some stress. Had a workup fpr vertebrobasilar insufficiency. She has numbness in both feet, but not in her hands and she will be having an EMG coming up soon.\par January 10, 2017. Patient here for followup. Not feeling well for about a month or so. Has chest pain and back pain when she takes a deep breath, but not with exertion. Needs laser surgery on her eyes complains about her body from feet to her head etc. Never did start the B12 injections that Dr. Rosenstein wanted. She is worried she might need another stent but again no exertional component.\par May 16, 2017. Patient here in followup. No chest pain or shortness of breath. Tripped on the carpet and got an infection in the knee, where she had her knee replacement, but she was able to treated with oral antibiotics and is just about finished. Her weight is up she thinks. She is very concerned about her , who seems to be a little bit more unbalanced and has a little bit more memory difficulties secondary to his intracerebral hemorrhage surgery and obviously getting older as well. No change in any of her medication.\par September 12, 2017. Patient is here in followup. She has been diagnosed with a peripheral neuropathy and is getting B12 injections. Her legs get numb when she flexes them up towards herself. Meanwhile, she is not walking much, so she is gaining a lot of weight. No chest pain, no shortness of breath, but is not as active as she used to be.\par January 9, 2018. The patient is here in followup. She had a six-month breast cancer followup in September, and everything checked out okay. She denies any increase in exertional chest pain or shortness of breath. There is a fair amount of stress from her 's memory and her 22-year-old granddaughter who is living with them. No change in medications.\par May 8, 2018. Patient here in followup. Doing well except gaining weight, not losing. Hed CAT scan showing coronary artery calcifications, and she was very concerned about the report, but I explained it is a normal finding. At this age and does not necessarily correlate with coronary artery disease. The report also mentioned enlarged heart and mitral valve calcification. No recent echo. Here her blood pressure is excellent. EKG unchanged.\par May 14, 2018. Returned for echocardiogram. Moderate MR. Mildly calcified aortic valve. Mild LAE. Normal LV function with concentric remodeling and mild diastolic dysfunction. Mild SYDNEE, with normal RV size and function. Moderate to severe TR with RVSP 44.\par September 13, 2018. Patient here to followup with her . No real complaints. Still, dyspnea on exertion going up stairs, but no chest pressure, and no change. VPCs on EKG, and on exam. Asymptomatic.\par October 29, 2018. On the 19th patient went to GI for colitis type complaints and was found to be in rapid A. fib. Sent to ER at [facility]. Converted with Cardizem and was started on Xarelto and discharged. I spoke with her on the 24th and arranged to see her today. In the meantime over the weekend she was readmitted to [facility] because of palpitations although not clear that she was in atrial fibrillation. She is here today in sinus rhythm with very frequent APCs. Quadrigeminy. For some reason she stopped her other medications, including her beta blocker her diuretic her statin and Plavix. She is also on metronidazole and Cipro because of her colitis.\par November 19, 2018. Patient is here in followup and for a clearance for a sigmoid biopsy and colonoscopy by Dr. Jonny Connor. The patient remains in sinus rhythm on sotalol. She is asymptomatic. She is somewhat nervous about the anesthesia and procedure. It is scheduled for November 29.She had been on 15 mg of Xarelto from the hospital, but based on her kidney function should be on 20 mg. If she stops the anticoagulation she does need to take baby aspirin because of her cardiac stent.\par January 18, 2019. Patient here in followup. Had a chest x-ray around January 4 and was nervous because he reports that prominent pulmonary arteries, possible pulmonary hypertension. Patient is here and remains in sinus rhythm. No signs of pulmonary hypertension on her EKG. (Echo in May, 2018 did have moderate TR with RVSP 44.) A little depressed over her macular degeneration and now she can't drive etc. otherwise no cardiac complaints. Weight is stable.\par July 26, 2019. Patient here in followup. Remains in sinus rhythm. Saw Dr. Rosenstein for workup of her peripheral neuropathy. He told her she was diabetic, although her hemoglobin A1c was 6.4 in June, which is where has been her last 3 visits here. In fact, she stopped eating cake and now has lost 12 pounds. By the same token, she could have a diabetic neuropathy, even before the full blown diagnosis of diabetes. In any case, she seems to be fairly stable, although a lot of stress because of her , as well as her own macular degeneration. She is also now on B12 shots. Her labs were also notable for a weekly migrating paraprotein that could not be characterized and homogenous JESSE at 1:320.\par November 8, 2019.  Patient here in follow-up.  "I am diabetic now".  She is on metformin 1000 and day and tolerating it.  She cut out cake and cookies and has lost 6 pounds since July and 18 pounds since January.  Only stress is regarding her .  Had a lump in her arm that turned out to be a biceps tear and she may be going to physical therapy.  Had a flu shot already.\par March 30, 2020.  Patient awoke with atypical left arm and shoulder pain and maybe some chest pain. Different from her angina and she thinks maybe she just slept wrong. But while thinking about it and being nervous about it she had palpitations that lasted for maybe 15 to 20 minutes. She did not have shortness of breath dizziness or worsening chest pain with that. The palpitations are gone. The patient was given an appointment to come in later today but she is very scared about the coronavirus and anxious about coming in. Currently her arm pain has resolved as well. After long discussion we elected to have her stay home and relax and only come in if the symptoms recur.  \par May 26, 2020.  Patient here in follow-up.  No more of the arm pain mentioned above.  Doing well overall just hard to deal with the stress of COVID-19 and her 's mild but progressing dementia.  Denies chest pain shortness of breath palpitations.  A little orthostatic lightheadedness.  EKG is sinus rhythm at 62 and unremarkable and unchanged.\par September 23, 2020.  Patient here in follow-up and for echocardiogram.  She turned 89.  She did gain 5 pounds.  Doing well but a lot of stress with her  because of his mild dementia.  Her echocardiogram is for the most part unchanged from 2 years ago.  She has normal LV function and she has only mild to moderate MR and moderate aortic stenosis and tricuspid regurgitation.  RVSP is 44 with normal RV function.\par January 26, 2021.  Patient here in follow-up.  Doing well although her  now hears voices.  She started eating some cake from family celebrations and noticed that her sugar the next morning was still normal so she kept on eating cake and has gained 6 pounds.  She also blames it on inactivity from Covid.  No cardiac symptoms.  EKG is sinus bradycardia and unchanged.  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  Patient now here with her  in follow-up.  Her walking is a little harder and Dr. Rosenstein of neurology is sending her for physical therapy.  No chest pain or shortness of breath.  Has lost 2 pounds.  No interval medical issues.  Sinus rhythm with APCs and otherwise unchanged.  Having some constipation, possibly from diltiazem.

## 2021-05-25 NOTE — PHYSICAL EXAM
[General Appearance - Well Developed] : well developed [Normal Appearance] : normal appearance [Well Groomed] : well groomed [General Appearance - Well Nourished] : well nourished [No Deformities] : no deformities [General Appearance - In No Acute Distress] : no acute distress [Normal Conjunctiva] : the conjunctiva exhibited no abnormalities [Eyelids - No Xanthelasma] : the eyelids demonstrated no xanthelasmas [Normal Oral Mucosa] : normal oral mucosa [No Oral Pallor] : no oral pallor [No Oral Cyanosis] : no oral cyanosis [Normal Jugular Venous A Waves Present] : normal jugular venous A waves present [Normal Jugular Venous V Waves Present] : normal jugular venous V waves present [No Jugular Venous Epps A Waves] : no jugular venous epps A waves [Respiration, Rhythm And Depth] : normal respiratory rhythm and effort [Exaggerated Use Of Accessory Muscles For Inspiration] : no accessory muscle use [Auscultation Breath Sounds / Voice Sounds] : lungs were clear to auscultation bilaterally [Heart Rate And Rhythm] : heart rate and rhythm were normal [Heart Sounds] : normal S1 and S2 [Murmurs] : no murmurs present [Abdomen Soft] : soft [Abdomen Tenderness] : non-tender [Abdomen Mass (___ Cm)] : no abdominal mass palpated [Abnormal Walk] : normal gait [Gait - Sufficient For Exercise Testing] : the gait was sufficient for exercise testing [Nail Clubbing] : no clubbing of the fingernails [Cyanosis, Localized] : no localized cyanosis [Petechial Hemorrhages (___cm)] : no petechial hemorrhages [Skin Color & Pigmentation] : normal skin color and pigmentation [] : no rash [No Venous Stasis] : no venous stasis [Skin Lesions] : no skin lesions [No Skin Ulcers] : no skin ulcer [No Xanthoma] : no  xanthoma was observed [Oriented To Time, Place, And Person] : oriented to person, place, and time [Affect] : the affect was normal [Mood] : the mood was normal [No Anxiety] : not feeling anxious [FreeTextEntry1] : No ankle edema (just "fat" ankles). Pedal pulses intact. Pulses symmetric, and no pulse deficiits

## 2021-05-25 NOTE — REVIEW OF SYSTEMS
[Weight Loss (___ Lbs)] : [unfilled] ~Ulb weight loss [Hearing Loss] : hearing loss [Dyspnea on exertion] : not dyspnea during exertion [Chest Discomfort] : no chest discomfort [Lower Ext Edema] : no extremity edema [Joint Pain] : joint pain [Negative] : Heme/Lymph

## 2021-05-26 LAB
ALBUMIN SERPL ELPH-MCNC: 4.4 G/DL
ALP BLD-CCNC: 79 U/L
ALT SERPL-CCNC: 6 U/L
ANION GAP SERPL CALC-SCNC: 12 MMOL/L
AST SERPL-CCNC: 14 U/L
BASOPHILS # BLD AUTO: 0.04 K/UL
BASOPHILS NFR BLD AUTO: 0.7 %
BILIRUB SERPL-MCNC: 0.3 MG/DL
BUN SERPL-MCNC: 24 MG/DL
CALCIUM SERPL-MCNC: 10.1 MG/DL
CHLORIDE SERPL-SCNC: 98 MMOL/L
CHOLEST SERPL-MCNC: 131 MG/DL
CO2 SERPL-SCNC: 26 MMOL/L
CREAT SERPL-MCNC: 0.78 MG/DL
EOSINOPHIL # BLD AUTO: 0.04 K/UL
EOSINOPHIL NFR BLD AUTO: 0.7 %
ESTIMATED AVERAGE GLUCOSE: 128 MG/DL
GLUCOSE SERPL-MCNC: 91 MG/DL
HBA1C MFR BLD HPLC: 6.1 %
HCT VFR BLD CALC: 36.7 %
HDLC SERPL-MCNC: 47 MG/DL
HGB BLD-MCNC: 11.4 G/DL
IMM GRANULOCYTES NFR BLD AUTO: 0.3 %
LDLC SERPL CALC-MCNC: 63 MG/DL
LYMPHOCYTES # BLD AUTO: 1.94 K/UL
LYMPHOCYTES NFR BLD AUTO: 33.9 %
MAN DIFF?: NORMAL
MCHC RBC-ENTMCNC: 26.9 PG
MCHC RBC-ENTMCNC: 31.1 GM/DL
MCV RBC AUTO: 86.6 FL
MONOCYTES # BLD AUTO: 0.55 K/UL
MONOCYTES NFR BLD AUTO: 9.6 %
NEUTROPHILS # BLD AUTO: 3.13 K/UL
NEUTROPHILS NFR BLD AUTO: 54.8 %
NONHDLC SERPL-MCNC: 84 MG/DL
NT-PROBNP SERPL-MCNC: 275 PG/ML
PLATELET # BLD AUTO: 231 K/UL
POTASSIUM SERPL-SCNC: 4.3 MMOL/L
PROT SERPL-MCNC: 6.7 G/DL
RBC # BLD: 4.24 M/UL
RBC # FLD: 14.3 %
SODIUM SERPL-SCNC: 136 MMOL/L
TRIGL SERPL-MCNC: 104 MG/DL
VIT B12 SERPL-MCNC: 953 PG/ML
WBC # FLD AUTO: 5.72 K/UL

## 2021-07-12 ENCOUNTER — RX RENEWAL (OUTPATIENT)
Age: 86
End: 2021-07-12

## 2021-07-20 ENCOUNTER — RX RENEWAL (OUTPATIENT)
Age: 86
End: 2021-07-20

## 2021-07-23 ENCOUNTER — LABORATORY RESULT (OUTPATIENT)
Age: 86
End: 2021-07-23

## 2021-08-03 ENCOUNTER — RX RENEWAL (OUTPATIENT)
Age: 86
End: 2021-08-03

## 2021-08-12 ENCOUNTER — APPOINTMENT (OUTPATIENT)
Dept: SURGICAL ONCOLOGY | Facility: CLINIC | Age: 86
End: 2021-08-12
Payer: MEDICARE

## 2021-08-12 VITALS
HEIGHT: 62 IN | WEIGHT: 186 LBS | RESPIRATION RATE: 16 BRPM | DIASTOLIC BLOOD PRESSURE: 67 MMHG | OXYGEN SATURATION: 98 % | SYSTOLIC BLOOD PRESSURE: 120 MMHG | HEART RATE: 57 BPM | BODY MASS INDEX: 34.23 KG/M2

## 2021-08-12 PROCEDURE — 99214 OFFICE O/P EST MOD 30 MIN: CPT

## 2021-08-12 NOTE — ASSESSMENT
[FreeTextEntry1] : IMP\par Right breast cancer s/p lumpectomy 2012 - NE on Arimidex 1 mg daily\par \par Plan:\par RTO q 6 months with bloodwork\par Bloodwork now (Ordered)\par Annual mammogram and sonogram (ordered )

## 2021-08-12 NOTE — HISTORY OF PRESENT ILLNESS
[de-identified] : Alee Velasquez is a 89 year old patient presenting for a 6 month follow up visit. \par \par Blood work 7/23/21: \par CEA: 1.3 ng/ml\par CA 27.29: 21.5 U/ml\par LFT's: WNL ( ALT 7 U/L low) \par \par Mammo/sono 2021..................\par \par \par \par PERTINENT HISTORY: \par RIGHT breast cancer; s/p RIGHT lumpectomy with sentinel lymph node biopsy in 4/2012, path revealed T1N0 invasive ductal carcinoma, ER/ID+, HER2-;Oncotype Dx= 1, s/p radiation therapy. Patient is currently on Arimidex 1 mg PO daily with no complaints. \par \par At a visit on 9/10/19, she was with c/o a few week history of a lump involving her left forearm which has increased in size over time.  She underwent a left arm MRI on the same day showing severe insertional biceps tendinosis with partial tear and moderate- large bicipitoradial bursitis. She was referred to Ortho who recommended PT.     A-fib and is on Xarelto. On Metformin for diabetes. \par \par She completed a bilateral mammogram and sonogram in June 2020 with benign findings (BIRADS 2). \par \par No recent tumor markers. \par LFTs 9/2020- WNL\par \par Denies palpable breast masses or nipple discharge. \par \par Internist: Dr. Do. \par

## 2021-08-12 NOTE — PHYSICAL EXAM
[Normal] : supple, no neck mass and thyroid not enlarged [Normal Neck Lymph Nodes] : normal neck lymph nodes  [Normal Supraclavicular Lymph Nodes] : normal supraclavicular lymph nodes [Normal Axillary Lymph Nodes] : normal axillary lymph nodes [Normal] : oriented to person, place and time, with appropriate affect [de-identified] : S/p right lumpectomy; no masses

## 2021-09-01 ENCOUNTER — APPOINTMENT (OUTPATIENT)
Dept: CARDIOLOGY | Facility: CLINIC | Age: 86
End: 2021-09-01
Payer: MEDICARE

## 2021-09-01 ENCOUNTER — NON-APPOINTMENT (OUTPATIENT)
Age: 86
End: 2021-09-01

## 2021-09-01 VITALS
OXYGEN SATURATION: 97 % | BODY MASS INDEX: 33.49 KG/M2 | RESPIRATION RATE: 17 BRPM | HEART RATE: 65 BPM | WEIGHT: 182 LBS | DIASTOLIC BLOOD PRESSURE: 71 MMHG | HEIGHT: 62 IN | SYSTOLIC BLOOD PRESSURE: 119 MMHG

## 2021-09-01 DIAGNOSIS — I20.9 ANGINA PECTORIS, UNSPECIFIED: ICD-10-CM

## 2021-09-01 PROCEDURE — 36415 COLL VENOUS BLD VENIPUNCTURE: CPT

## 2021-09-01 PROCEDURE — 93000 ELECTROCARDIOGRAM COMPLETE: CPT

## 2021-09-01 PROCEDURE — 99215 OFFICE O/P EST HI 40 MIN: CPT

## 2021-09-03 LAB
ALBUMIN SERPL ELPH-MCNC: 4.3 G/DL
ALP BLD-CCNC: 76 U/L
ALT SERPL-CCNC: 10 U/L
ANION GAP SERPL CALC-SCNC: 14 MMOL/L
AST SERPL-CCNC: 17 U/L
BASOPHILS # BLD AUTO: 0.05 K/UL
BASOPHILS NFR BLD AUTO: 0.9 %
BILIRUB SERPL-MCNC: 0.2 MG/DL
BUN SERPL-MCNC: 23 MG/DL
CALCIUM SERPL-MCNC: 9.8 MG/DL
CHLORIDE SERPL-SCNC: 101 MMOL/L
CHOLEST SERPL-MCNC: 132 MG/DL
CO2 SERPL-SCNC: 24 MMOL/L
CREAT SERPL-MCNC: 0.78 MG/DL
EOSINOPHIL # BLD AUTO: 0.05 K/UL
EOSINOPHIL NFR BLD AUTO: 0.9 %
ESTIMATED AVERAGE GLUCOSE: 134 MG/DL
GLUCOSE SERPL-MCNC: 91 MG/DL
HBA1C MFR BLD HPLC: 6.3 %
HCT VFR BLD CALC: 35.7 %
HDLC SERPL-MCNC: 53 MG/DL
HGB BLD-MCNC: 11.6 G/DL
IMM GRANULOCYTES NFR BLD AUTO: 0.4 %
LDLC SERPL CALC-MCNC: 63 MG/DL
LYMPHOCYTES # BLD AUTO: 2.01 K/UL
LYMPHOCYTES NFR BLD AUTO: 35.6 %
MAN DIFF?: NORMAL
MCHC RBC-ENTMCNC: 27.6 PG
MCHC RBC-ENTMCNC: 32.5 GM/DL
MCV RBC AUTO: 84.8 FL
MONOCYTES # BLD AUTO: 0.71 K/UL
MONOCYTES NFR BLD AUTO: 12.6 %
NEUTROPHILS # BLD AUTO: 2.81 K/UL
NEUTROPHILS NFR BLD AUTO: 49.6 %
NONHDLC SERPL-MCNC: 78 MG/DL
NT-PROBNP SERPL-MCNC: 308 PG/ML
PLATELET # BLD AUTO: 229 K/UL
POTASSIUM SERPL-SCNC: 4.4 MMOL/L
PROT SERPL-MCNC: 6.9 G/DL
RBC # BLD: 4.21 M/UL
RBC # FLD: 15.1 %
SODIUM SERPL-SCNC: 139 MMOL/L
TRIGL SERPL-MCNC: 75 MG/DL
TSH SERPL-ACNC: 3 UIU/ML
WBC # FLD AUTO: 5.65 K/UL

## 2021-10-12 ENCOUNTER — RX RENEWAL (OUTPATIENT)
Age: 86
End: 2021-10-12

## 2021-10-13 ENCOUNTER — NON-APPOINTMENT (OUTPATIENT)
Age: 86
End: 2021-10-13

## 2021-10-14 ENCOUNTER — NON-APPOINTMENT (OUTPATIENT)
Age: 86
End: 2021-10-14

## 2021-10-27 ENCOUNTER — APPOINTMENT (OUTPATIENT)
Dept: CARDIOLOGY | Facility: CLINIC | Age: 86
End: 2021-10-27
Payer: MEDICARE

## 2021-10-27 PROCEDURE — 93306 TTE W/DOPPLER COMPLETE: CPT

## 2021-10-28 NOTE — HISTORY OF PRESENT ILLNESS
[FreeTextEntry1] : (MED HX) Patient with known coronary disease with stents to her circumflex artery and obtuse marginal going back to 2006 and 2008. Normal stress thallium in 2011 before surgery. Symptoms of esophageal reflux on and off that have been different than her angina. However in August, 2012 she had a return of her usual angina which initially responded to bisoprolol but then her symptoms increased and the patient called me on September 7. She was scheduled for a cardiac catheterization on September 10 and found to have a subtotal occlusion of her circumflex. This was treated with a drug-eluting stent and she was sent home on the 11th on aspirin and Plavix and she came  for followup 9/28/12. Since being home she has not had any of those same symptoms and is able to walk up the stairs now without shortness of breath or chest pain.\par She was here in January, 2013 for clearance for cataract surgery. She had no complications and was doing well since then other than having trouble maintaining her weight as she goes to [employer] and her  is able to eat cake and other foods without gaining any weight. She denies chest pain or shortness of breath with exertion, although when she lies down in bed she says she feels short of breath briefly.\par August 19, 2013. Here for followup and has no real complaints. She did have some pains in the legs that have been getting better with physical therapy. She denies exertional chest pain or shortness of breath. She is still having trouble losing weight. All seemed acceptable. She had labs with Dr. Do in October. BUN was slightly elevated and the sodium was 131 so he was asking me about cutting back on the diuretic. In addition she was anemic with an MCV of 80.\par January 15, 2014. The patient is here for followup. Her main issue is that she may need carpal tunnel surgery on both sides. She has some atypical pain in the upper left chest and neck area that is nonexertional and sounds more arthritic or musculoskeletal. She denies chest pain or shortness of breath. She was pleased to find out that based on the scale here she has lost 9 pounds.\par Tatiana 10, 2014. The patient is here for followup. She didn't need the carpal tunnel surgery but instead responded well to a steroid injection. However she developed bursitis in her right shoulder and had a steroid shot that just lasted for about a month. Now she is finishing up with physical therapy. Her main complaint is that she just cannot walk like she used to; she just kind of hits a wall and has to slow down. She denies exertional chest pain or shortness of breath and no definite leg pain but thinks it could be her arthritis. She claims her weight is about the same. She has had no change in her medications recently.\par The patient saw Dr. Geronimo in September for preop evaluation and she underwent carpal tunnel surgery on October 2. No complications and she saw Dr. Jaylan Christianson in followup.\par November 14, 2014. The patient is here in followup. When she lies down at night she gets pressure in her chest which feels similar to her previous symptoms and she was worried she might need another stent however she tries to see what happens when she walks up and down the stairs and has absolutely no symptoms whatsoever. Upon further questioning it seems that she was told to go back on the aspirin in addition to the Plavix by the presurgical testing people before the carpal tunnel surgery and it is only since being back on the aspirin that she has these symptoms. There is no shortness of breath. She is having trouble losing weight. I felt fairly certain that her symptoms were reflux and related to the aspirin. The aspirin was stopped and the AcipHex was increased to b.i.d.\par March 23, 2015. Patient is here for followup. On the whole she has been doing well since her last visit without recurrence of her chest pain or shortness of breath. However about 2-3 months ago she had a syncopal episode when she got up from the table and before she realized it she was on the floor. When she came to she was a little shaky but otherwise was okay. No recurrence. The next day she was in pain in her joints and was worried about her knee so  she was checked out by orthopedics and everything was fine. She has had no orthostatic symptoms since then and no lightheadedness or near syncope since then. She is on a diuretic every other day and she does not recall being ill around that time or any reason why she might have been dehydrated. She is on 5 mg of bisoprolol but has had no other episodes of syncope or near syncope in the last 2 months. Workup was unremarkable\par July 29, 2015. The patient is here in followup. She is complaining that her legs and arms feel heavy as if she's walking in sand or water. She is borderline hypotensive and it could just be because of the heat and volume depletion but the patient has not lost any weight and she claims that the symptoms have gone on for a few months not just recently when it's been hot. She denies chest pain or exertional shortness of breath. She has been having some trouble sleeping as well. Her EKG is totally within normal limits. I elected to hold her valsartan and then check labs for kidney function looking for volume depletion as to whether we should cut back on the diuretic more than just every other day. The possibility includes whether these are side effects of statin. No recurrence of her syncope. \par November 12, 2015. The patient is here for followup. She is now off Diovan because of her orthostatic symptoms and these seemed to have improved. She has no specific complaints. She is asking about aspirin versus Plavix, and she has remained just on Plavix most of this time. She does have esophageal reflux and is under treatment and under control. She saw Dr. Olmos in followup for her breast cancer and everything is fine and she is to see him again in 6 months. She denies exertional chest pain or shortness of breath. Her weight is still an issue.\par March 9, 2016. The patient is here for followup. Has been under a fair amount of stress as well as increased physical activity around the house with her 's intracerebral hemorrhage. He is improving greatly which has lessened her stress somewhat. She does not complain of chest pain or shortness of breath. She has an upcoming followup with oncologist and that'll be four years so far since her cancer surgery.\par June 16, 2016. Patient is here for followup with a little stress as her  is doing a little better, but she's been having episodes of trouble with her balance. No vertigo. No lightheadedness, syncope, or near syncope. She can't even feel unbalanced while sitting. Usually can last an hour but can function somewhat. Happens about once a week. Does not seem to be related to food or medication.\par October 13, 2016. Patient is here to follow up with her . They celebrated their 65th wedding anniversary. She is doing about the same, with some stress. Had a workup fpr vertebrobasilar insufficiency. She has numbness in both feet, but not in her hands and she will be having an EMG coming up soon.\par January 10, 2017. Patient here for followup. Not feeling well for about a month or so. Has chest pain and back pain when she takes a deep breath, but not with exertion. Needs laser surgery on her eyes complains about her body from feet to her head etc. Never did start the B12 injections that Dr. Rosenstein wanted. She is worried she might need another stent but again no exertional component.\par May 16, 2017. Patient here in followup. No chest pain or shortness of breath. Tripped on the carpet and got an infection in the knee, where she had her knee replacement, but she was able to treated with oral antibiotics and is just about finished. Her weight is up she thinks. She is very concerned about her , who seems to be a little bit more unbalanced and has a little bit more memory difficulties secondary to his intracerebral hemorrhage surgery and obviously getting older as well. No change in any of her medication.\par September 12, 2017. Patient is here in followup. She has been diagnosed with a peripheral neuropathy and is getting B12 injections. Her legs get numb when she flexes them up towards herself. Meanwhile, she is not walking much, so she is gaining a lot of weight. No chest pain, no shortness of breath, but is not as active as she used to be.\par January 9, 2018. The patient is here in followup. She had a six-month breast cancer followup in September, and everything checked out okay. She denies any increase in exertional chest pain or shortness of breath. There is a fair amount of stress from her 's memory and her 22-year-old granddaughter who is living with them. No change in medications.\par May 8, 2018. Patient here in followup. Doing well except gaining weight, not losing. Hed CAT scan showing coronary artery calcifications, and she was very concerned about the report, but I explained it is a normal finding. At this age and does not necessarily correlate with coronary artery disease. The report also mentioned enlarged heart and mitral valve calcification. No recent echo. Here her blood pressure is excellent. EKG unchanged.\par May 14, 2018. Returned for echocardiogram. Moderate MR. Mildly calcified aortic valve. Mild LAE. Normal LV function with concentric remodeling and mild diastolic dysfunction. Mild SYDNEE, with normal RV size and function. Moderate to severe TR with RVSP 44.\par September 13, 2018. Patient here to followup with her . No real complaints. Still, dyspnea on exertion going up stairs, but no chest pressure, and no change. VPCs on EKG, and on exam. Asymptomatic.\par October 29, 2018. On the 19th patient went to GI for colitis type complaints and was found to be in rapid A. fib. Sent to ER at [facility]. Converted with Cardizem and was started on Xarelto and discharged. I spoke with her on the 24th and arranged to see her today. In the meantime over the weekend she was readmitted to [facility] because of palpitations although not clear that she was in atrial fibrillation. She is here today in sinus rhythm with very frequent APCs. Quadrigeminy. For some reason she stopped her other medications, including her beta blocker her diuretic her statin and Plavix. She is also on metronidazole and Cipro because of her colitis.\par November 19, 2018. Patient is here in followup and for a clearance for a sigmoid biopsy and colonoscopy by Dr. Jonny Connor. The patient remains in sinus rhythm on sotalol. She is asymptomatic. She is somewhat nervous about the anesthesia and procedure. It is scheduled for November 29.She had been on 15 mg of Xarelto from the hospital, but based on her kidney function should be on 20 mg. If she stops the anticoagulation she does need to take baby aspirin because of her cardiac stent.\par January 18, 2019. Patient here in followup. Had a chest x-ray around January 4 and was nervous because he reports that prominent pulmonary arteries, possible pulmonary hypertension. Patient is here and remains in sinus rhythm. No signs of pulmonary hypertension on her EKG. (Echo in May, 2018 did have moderate TR with RVSP 44.) A little depressed over her macular degeneration and now she can't drive etc. otherwise no cardiac complaints. Weight is stable.\par July 26, 2019. Patient here in followup. Remains in sinus rhythm. Saw Dr. Rosenstein for workup of her peripheral neuropathy. He told her she was diabetic, although her hemoglobin A1c was 6.4 in June, which is where has been her last 3 visits here. In fact, she stopped eating cake and now has lost 12 pounds. By the same token, she could have a diabetic neuropathy, even before the full blown diagnosis of diabetes. In any case, she seems to be fairly stable, although a lot of stress because of her , as well as her own macular degeneration. She is also now on B12 shots. Her labs were also notable for a weekly migrating paraprotein that could not be characterized and homogenous JESSE at 1:320.\par November 8, 2019.  Patient here in follow-up.  "I am diabetic now".  She is on metformin 1000 and day and tolerating it.  She cut out cake and cookies and has lost 6 pounds since July and 18 pounds since January.  Only stress is regarding her .  Had a lump in her arm that turned out to be a biceps tear and she may be going to physical therapy.  Had a flu shot already.\par March 30, 2020.  Patient awoke with atypical left arm and shoulder pain and maybe some chest pain. Different from her angina and she thinks maybe she just slept wrong. But while thinking about it and being nervous about it she had palpitations that lasted for maybe 15 to 20 minutes. She did not have shortness of breath dizziness or worsening chest pain with that. The palpitations are gone. The patient was given an appointment to come in later today but she is very scared about the coronavirus and anxious about coming in. Currently her arm pain has resolved as well. After long discussion we elected to have her stay home and relax and only come in if the symptoms recur.  \par May 26, 2020.  Patient here in follow-up.  No more of the arm pain mentioned above.  Doing well overall just hard to deal with the stress of COVID-19 and her 's mild but progressing dementia.  Denies chest pain shortness of breath palpitations.  A little orthostatic lightheadedness.  EKG is sinus rhythm at 62 and unremarkable and unchanged.\par September 23, 2020.  Patient here in follow-up and for echocardiogram.  She turned 89.  She did gain 5 pounds.  Doing well but a lot of stress with her  because of his mild dementia.  Her echocardiogram is for the most part unchanged from 2 years ago.  She has normal LV function and she has only mild to moderate MR and moderate aortic stenosis and tricuspid regurgitation.  RVSP is 44 with normal RV function.\par January 26, 2021.  Patient here in follow-up.  Doing well although her  now hears voices.  She started eating some cake from family celebrations and noticed that her sugar the next morning was still normal so she kept on eating cake and has gained 6 pounds.  She also blames it on inactivity from Covid.  No cardiac symptoms.  EKG is sinus bradycardia and unchanged.  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  Patient now here with her  in follow-up.  Her walking is a little harder and Dr. Rosenstein of neurology is sending her for physical therapy.  No chest pain or shortness of breath.  Has lost 2 pounds.  No interval medical issues.  Sinus rhythm with APCs and otherwise unchanged.  Having some constipation, possibly from diltiazem.\par September 1, 2021.  Patient here in follow-up.  Has been going for physical therapy but has definitely been noticing some new symptoms.  She is not a great descriptor or historian but there is clearly shortness of breath with exertion although maybe not chest pain or pressure.  She is not clear if this is similar to a different from when she had her stents.  After long discussion and review of the options and after her exam and ECG we decided to first try a low-level stress echo and if she cannot do enough exercise to make a diagnosis she would then need a pharmacologic nuclear study.\par October 13, 2021. Different sx, now non-exertional SOB associated with trouble swallowing. Scheduled for stress in 2 weeks. She will call GI and I would like her to try PPI for now. If no change will come in. \par October 14, 2021.  JUMA ACOSTA - 14 Oct 2021 2:46 PM \par   TASK CREATED\par Ptaient would like to let you know that she is feeling much better and would see you on 10/27/21 \par October 27, 2021.Patient came for stress echo but it was clear she would not be able to walk on the treadmill so a plain echo was done first.  It demonstrated mild to moderate MR and mild MS.  Minimal AS and no AI.  Severe LAE. Normal LV size and function with LVEF 64%.  Normal RV size and function with mild to moderate TR and RVSP 42. Patient will be scheduled for pharmacologic nuclear study. \par October 28, 2021.  Reviewed echo with patient.  A lot of things going on now including taking her  for an endoscopy.  She claims that those symptoms have resolved and she really is feeling fine.  She promises that if she has any recurrence of the symptoms she will call and schedule be pharmacologic nuclear study but for now she would like to hold off which is reasonable as long as she remains honest with herself and her symptoms.\par

## 2021-10-28 NOTE — REVIEW OF SYSTEMS
[Weight Loss (___ Lbs)] : [unfilled] ~Ulb weight loss [Hearing Loss] : hearing loss [Joint Pain] : joint pain [Negative] : Heme/Lymph [Dyspnea on exertion] : dyspnea during exertion [Chest Discomfort] : no chest discomfort [Lower Ext Edema] : no extremity edema

## 2021-11-04 ENCOUNTER — RX RENEWAL (OUTPATIENT)
Age: 86
End: 2021-11-04

## 2021-11-11 ENCOUNTER — RX RENEWAL (OUTPATIENT)
Age: 86
End: 2021-11-11

## 2021-11-16 ENCOUNTER — APPOINTMENT (OUTPATIENT)
Dept: CARDIOLOGY | Facility: CLINIC | Age: 86
End: 2021-11-16
Payer: MEDICARE

## 2021-11-16 PROCEDURE — A9500: CPT

## 2021-11-16 PROCEDURE — 78452 HT MUSCLE IMAGE SPECT MULT: CPT

## 2021-11-16 PROCEDURE — 93015 CV STRESS TEST SUPVJ I&R: CPT

## 2021-11-17 ENCOUNTER — NON-APPOINTMENT (OUTPATIENT)
Age: 86
End: 2021-11-17

## 2022-01-30 ENCOUNTER — RX RENEWAL (OUTPATIENT)
Age: 87
End: 2022-01-30

## 2022-02-17 ENCOUNTER — APPOINTMENT (OUTPATIENT)
Dept: SURGICAL ONCOLOGY | Facility: CLINIC | Age: 87
End: 2022-02-17
Payer: MEDICARE

## 2022-03-10 ENCOUNTER — LABORATORY RESULT (OUTPATIENT)
Age: 87
End: 2022-03-10

## 2022-03-18 ENCOUNTER — APPOINTMENT (OUTPATIENT)
Dept: SURGICAL ONCOLOGY | Facility: CLINIC | Age: 87
End: 2022-03-18
Payer: MEDICARE

## 2022-03-18 VITALS
HEART RATE: 53 BPM | RESPIRATION RATE: 16 BRPM | BODY MASS INDEX: 33.13 KG/M2 | SYSTOLIC BLOOD PRESSURE: 108 MMHG | WEIGHT: 180 LBS | OXYGEN SATURATION: 97 % | DIASTOLIC BLOOD PRESSURE: 69 MMHG | TEMPERATURE: 97.5 F | HEIGHT: 62 IN

## 2022-03-18 PROCEDURE — 99214 OFFICE O/P EST MOD 30 MIN: CPT

## 2022-03-18 NOTE — PHYSICAL EXAM
[Normal] : supple, no neck mass and thyroid not enlarged [Normal Supraclavicular Lymph Nodes] : normal supraclavicular lymph nodes [Normal Axillary Lymph Nodes] : normal axillary lymph nodes [Normal] : oriented to person, place and time, with appropriate affect [de-identified] : no masses

## 2022-03-18 NOTE — HISTORY OF PRESENT ILLNESS
[de-identified] : Alee Velasquez is a 89 year old patient presenting for a 6 month follow up visit. \par \par Blood work 7/23/21: \par CEA: 1.3 ng/ml\par CA 27.29: 21.5 U/ml\par LFT's: WNL ( ALT 7 U/L low) \par \par Mammo/sono 2021: pending report from [facility] \par \par \par \par PERTINENT HISTORY: \par RIGHT breast cancer; s/p RIGHT lumpectomy with sentinel lymph node biopsy in 4/2012, path revealed T1N0 invasive ductal carcinoma, ER/IL+, HER2-;Oncotype Dx= 1, s/p radiation therapy. Patient is currently on Arimidex 1 mg PO daily with no complaints. \par \par At a visit on 9/10/19, she was with c/o a few week history of a lump involving her left forearm which has increased in size over time.  She underwent a left arm MRI on the same day showing severe insertional biceps tendinosis with partial tear and moderate- large bicipitoradial bursitis. She was referred to Ortho who recommended PT.     A-fib and is on Xarelto. On Metformin for diabetes. \par \par She completed a bilateral mammogram and sonogram in June 2020 with benign findings (BIRADS 2). \par \par No recent tumor markers. \par LFTs 9/2020- WNL\par \par Denies palpable breast masses or nipple discharge. \par \par Internist: Dr. Do. \par

## 2022-03-18 NOTE — ASSESSMENT
[FreeTextEntry1] : IMP\par Right breast cancer s/p lumpectomy 2012 - NE on Arimidex 1 mg daily\par \par Plan:\par RTO q 6 months with bloodwork\par Annual mammogram and sonogram (May 2022)

## 2022-04-28 ENCOUNTER — RX RENEWAL (OUTPATIENT)
Age: 87
End: 2022-04-28

## 2022-05-08 ENCOUNTER — RX RENEWAL (OUTPATIENT)
Age: 87
End: 2022-05-08

## 2022-07-05 ENCOUNTER — APPOINTMENT (OUTPATIENT)
Dept: CARDIOLOGY | Facility: CLINIC | Age: 87
End: 2022-07-05

## 2022-07-05 ENCOUNTER — NON-APPOINTMENT (OUTPATIENT)
Age: 87
End: 2022-07-05

## 2022-07-05 VITALS
SYSTOLIC BLOOD PRESSURE: 110 MMHG | BODY MASS INDEX: 32.19 KG/M2 | HEART RATE: 61 BPM | OXYGEN SATURATION: 96 % | DIASTOLIC BLOOD PRESSURE: 69 MMHG | WEIGHT: 176 LBS

## 2022-07-05 PROCEDURE — 36415 COLL VENOUS BLD VENIPUNCTURE: CPT

## 2022-07-05 PROCEDURE — 99214 OFFICE O/P EST MOD 30 MIN: CPT

## 2022-07-05 PROCEDURE — 93000 ELECTROCARDIOGRAM COMPLETE: CPT

## 2022-07-05 NOTE — REVIEW OF SYSTEMS
[Hearing Loss] : hearing loss [Dyspnea on exertion] : dyspnea during exertion [Joint Pain] : joint pain [Negative] : Heme/Lymph [Weight Loss (___ Lbs)] : [unfilled] ~Ulb weight loss [Chest Discomfort] : no chest discomfort [Lower Ext Edema] : no extremity edema [Under Stress] : under stress [FreeTextEntry3] : Macular degeneration, almost legally blind. [de-identified] : Stress of her 's dementia

## 2022-07-05 NOTE — PHYSICAL EXAM
[General Appearance - Well Developed] : well developed [Normal Appearance] : normal appearance [Well Groomed] : well groomed [General Appearance - Well Nourished] : well nourished [No Deformities] : no deformities [General Appearance - In No Acute Distress] : no acute distress [Normal Conjunctiva] : the conjunctiva exhibited no abnormalities [Eyelids - No Xanthelasma] : the eyelids demonstrated no xanthelasmas [Normal Oral Mucosa] : normal oral mucosa [No Oral Pallor] : no oral pallor [No Oral Cyanosis] : no oral cyanosis [Normal Jugular Venous A Waves Present] : normal jugular venous A waves present [Normal Jugular Venous V Waves Present] : normal jugular venous V waves present [No Jugular Venous Epps A Waves] : no jugular venous epps A waves [Respiration, Rhythm And Depth] : normal respiratory rhythm and effort [Exaggerated Use Of Accessory Muscles For Inspiration] : no accessory muscle use [Auscultation Breath Sounds / Voice Sounds] : lungs were clear to auscultation bilaterally [Heart Rate And Rhythm] : heart rate and rhythm were normal [Heart Sounds] : normal S1 and S2 [Murmurs] : no murmurs present [Abdomen Soft] : soft [Abdomen Tenderness] : non-tender [Abdomen Mass (___ Cm)] : no abdominal mass palpated [Abnormal Walk] : normal gait [Gait - Sufficient For Exercise Testing] : the gait was sufficient for exercise testing [Nail Clubbing] : no clubbing of the fingernails [Cyanosis, Localized] : no localized cyanosis [Petechial Hemorrhages (___cm)] : no petechial hemorrhages [Skin Color & Pigmentation] : normal skin color and pigmentation [] : no rash [No Venous Stasis] : no venous stasis [Skin Lesions] : no skin lesions [No Skin Ulcers] : no skin ulcer [No Xanthoma] : no  xanthoma was observed [Oriented To Time, Place, And Person] : oriented to person, place, and time [Affect] : the affect was normal [Mood] : the mood was normal [No Anxiety] : not feeling anxious [FreeTextEntry1] : Trace ankle edema (just "fat" ankles). Pedal pulses intact. Pulses symmetric, and no pulse deficiits

## 2022-07-05 NOTE — HISTORY OF PRESENT ILLNESS
[FreeTextEntry1] : (MED HX) Patient with known coronary disease with stents to her circumflex artery and obtuse marginal going back to 2006 and 2008. Normal stress thallium in 2011 before surgery. Symptoms of esophageal reflux on and off that have been different than her angina. However in August, 2012 she had a return of her usual angina which initially responded to bisoprolol but then her symptoms increased and the patient called me on September 7. She was scheduled for a cardiac catheterization on September 10 and found to have a subtotal occlusion of her circumflex. This was treated with a drug-eluting stent and she was sent home on the 11th on aspirin and Plavix and she came  for followup 9/28/12. Since being home she has not had any of those same symptoms and is able to walk up the stairs now without shortness of breath or chest pain.\par She was here in January, 2013 for clearance for cataract surgery. She had no complications and was doing well since then other than having trouble maintaining her weight as she goes to [employer] and her  is able to eat cake and other foods without gaining any weight. She denies chest pain or shortness of breath with exertion, although when she lies down in bed she says she feels short of breath briefly.\par August 19, 2013. Here for followup and has no real complaints. She did have some pains in the legs that have been getting better with physical therapy. She denies exertional chest pain or shortness of breath. She is still having trouble losing weight. All seemed acceptable. She had labs with Dr. Do in October. BUN was slightly elevated and the sodium was 131 so he was asking me about cutting back on the diuretic. In addition she was anemic with an MCV of 80.\par January 15, 2014. The patient is here for followup. Her main issue is that she may need carpal tunnel surgery on both sides. She has some atypical pain in the upper left chest and neck area that is nonexertional and sounds more arthritic or musculoskeletal. She denies chest pain or shortness of breath. She was pleased to find out that based on the scale here she has lost 9 pounds.\par Tatiana 10, 2014. The patient is here for followup. She didn't need the carpal tunnel surgery but instead responded well to a steroid injection. However she developed bursitis in her right shoulder and had a steroid shot that just lasted for about a month. Now she is finishing up with physical therapy. Her main complaint is that she just cannot walk like she used to; she just kind of hits a wall and has to slow down. She denies exertional chest pain or shortness of breath and no definite leg pain but thinks it could be her arthritis. She claims her weight is about the same. She has had no change in her medications recently.\par The patient saw Dr. Geronimo in September for preop evaluation and she underwent carpal tunnel surgery on October 2. No complications and she saw Dr. Jaylan Christianson in followup.\par November 14, 2014. The patient is here in followup. When she lies down at night she gets pressure in her chest which feels similar to her previous symptoms and she was worried she might need another stent however she tries to see what happens when she walks up and down the stairs and has absolutely no symptoms whatsoever. Upon further questioning it seems that she was told to go back on the aspirin in addition to the Plavix by the presurgical testing people before the carpal tunnel surgery and it is only since being back on the aspirin that she has these symptoms. There is no shortness of breath. She is having trouble losing weight. I felt fairly certain that her symptoms were reflux and related to the aspirin. The aspirin was stopped and the AcipHex was increased to b.i.d.\par March 23, 2015. Patient is here for followup. On the whole she has been doing well since her last visit without recurrence of her chest pain or shortness of breath. However about 2-3 months ago she had a syncopal episode when she got up from the table and before she realized it she was on the floor. When she came to she was a little shaky but otherwise was okay. No recurrence. The next day she was in pain in her joints and was worried about her knee so  she was checked out by orthopedics and everything was fine. She has had no orthostatic symptoms since then and no lightheadedness or near syncope since then. She is on a diuretic every other day and she does not recall being ill around that time or any reason why she might have been dehydrated. She is on 5 mg of bisoprolol but has had no other episodes of syncope or near syncope in the last 2 months. Workup was unremarkable\par July 29, 2015. The patient is here in followup. She is complaining that her legs and arms feel heavy as if she's walking in sand or water. She is borderline hypotensive and it could just be because of the heat and volume depletion but the patient has not lost any weight and she claims that the symptoms have gone on for a few months not just recently when it's been hot. She denies chest pain or exertional shortness of breath. She has been having some trouble sleeping as well. Her EKG is totally within normal limits. I elected to hold her valsartan and then check labs for kidney function looking for volume depletion as to whether we should cut back on the diuretic more than just every other day. The possibility includes whether these are side effects of statin. No recurrence of her syncope. \par November 12, 2015. The patient is here for followup. She is now off Diovan because of her orthostatic symptoms and these seemed to have improved. She has no specific complaints. She is asking about aspirin versus Plavix, and she has remained just on Plavix most of this time. She does have esophageal reflux and is under treatment and under control. She saw Dr. Olmos in followup for her breast cancer and everything is fine and she is to see him again in 6 months. She denies exertional chest pain or shortness of breath. Her weight is still an issue.\par March 9, 2016. The patient is here for followup. Has been under a fair amount of stress as well as increased physical activity around the house with her 's intracerebral hemorrhage. He is improving greatly which has lessened her stress somewhat. She does not complain of chest pain or shortness of breath. She has an upcoming followup with oncologist and that'll be four years so far since her cancer surgery.\par June 16, 2016. Patient is here for followup with a little stress as her  is doing a little better, but she's been having episodes of trouble with her balance. No vertigo. No lightheadedness, syncope, or near syncope. She can't even feel unbalanced while sitting. Usually can last an hour but can function somewhat. Happens about once a week. Does not seem to be related to food or medication.\par October 13, 2016. Patient is here to follow up with her . They celebrated their 65th wedding anniversary. She is doing about the same, with some stress. Had a workup fpr vertebrobasilar insufficiency. She has numbness in both feet, but not in her hands and she will be having an EMG coming up soon.\par January 10, 2017. Patient here for followup. Not feeling well for about a month or so. Has chest pain and back pain when she takes a deep breath, but not with exertion. Needs laser surgery on her eyes complains about her body from feet to her head etc. Never did start the B12 injections that Dr. Rosenstein wanted. She is worried she might need another stent but again no exertional component.\par May 16, 2017. Patient here in followup. No chest pain or shortness of breath. Tripped on the carpet and got an infection in the knee, where she had her knee replacement, but she was able to treated with oral antibiotics and is just about finished. Her weight is up she thinks. She is very concerned about her , who seems to be a little bit more unbalanced and has a little bit more memory difficulties secondary to his intracerebral hemorrhage surgery and obviously getting older as well. No change in any of her medication.\par September 12, 2017. Patient is here in followup. She has been diagnosed with a peripheral neuropathy and is getting B12 injections. Her legs get numb when she flexes them up towards herself. Meanwhile, she is not walking much, so she is gaining a lot of weight. No chest pain, no shortness of breath, but is not as active as she used to be.\par January 9, 2018. The patient is here in followup. She had a six-month breast cancer followup in September, and everything checked out okay. She denies any increase in exertional chest pain or shortness of breath. There is a fair amount of stress from her 's memory and her 22-year-old granddaughter who is living with them. No change in medications.\par May 8, 2018. Patient here in followup. Doing well except gaining weight, not losing. Hed CAT scan showing coronary artery calcifications, and she was very concerned about the report, but I explained it is a normal finding. At this age and does not necessarily correlate with coronary artery disease. The report also mentioned enlarged heart and mitral valve calcification. No recent echo. Here her blood pressure is excellent. EKG unchanged.\par May 14, 2018. Returned for echocardiogram. Moderate MR. Mildly calcified aortic valve. Mild LAE. Normal LV function with concentric remodeling and mild diastolic dysfunction. Mild SYDNEE, with normal RV size and function. Moderate to severe TR with RVSP 44.\par September 13, 2018. Patient here to followup with her . No real complaints. Still, dyspnea on exertion going up stairs, but no chest pressure, and no change. VPCs on EKG, and on exam. Asymptomatic.\par October 29, 2018. On the 19th patient went to GI for colitis type complaints and was found to be in rapid A. fib. Sent to ER at [facility]. Converted with Cardizem and was started on Xarelto and discharged. I spoke with her on the 24th and arranged to see her today. In the meantime over the weekend she was readmitted to [facility] because of palpitations although not clear that she was in atrial fibrillation. She is here today in sinus rhythm with very frequent APCs. Quadrigeminy. For some reason she stopped her other medications, including her beta blocker her diuretic her statin and Plavix. She is also on metronidazole and Cipro because of her colitis.\par November 19, 2018. Patient is here in followup and for a clearance for a sigmoid biopsy and colonoscopy by Dr. Jonny Connro. The patient remains in sinus rhythm on sotalol. She is asymptomatic. She is somewhat nervous about the anesthesia and procedure. It is scheduled for November 29.She had been on 15 mg of Xarelto from the hospital, but based on her kidney function should be on 20 mg. If she stops the anticoagulation she does need to take baby aspirin because of her cardiac stent.\par January 18, 2019. Patient here in followup. Had a chest x-ray around January 4 and was nervous because he reports that prominent pulmonary arteries, possible pulmonary hypertension. Patient is here and remains in sinus rhythm. No signs of pulmonary hypertension on her EKG. (Echo in May, 2018 did have moderate TR with RVSP 44.) A little depressed over her macular degeneration and now she can't drive etc. otherwise no cardiac complaints. Weight is stable.\par July 26, 2019. Patient here in followup. Remains in sinus rhythm. Saw Dr. Rosenstein for workup of her peripheral neuropathy. He told her she was diabetic, although her hemoglobin A1c was 6.4 in June, which is where has been her last 3 visits here. In fact, she stopped eating cake and now has lost 12 pounds. By the same token, she could have a diabetic neuropathy, even before the full blown diagnosis of diabetes. In any case, she seems to be fairly stable, although a lot of stress because of her , as well as her own macular degeneration. She is also now on B12 shots. Her labs were also notable for a weekly migrating paraprotein that could not be characterized and homogenous JESSE at 1:320.\par November 8, 2019.  Patient here in follow-up.  "I am diabetic now".  She is on metformin 1000 and day and tolerating it.  She cut out cake and cookies and has lost 6 pounds since July and 18 pounds since January.  Only stress is regarding her .  Had a lump in her arm that turned out to be a biceps tear and she may be going to physical therapy.  Had a flu shot already.\par March 30, 2020.  Patient awoke with atypical left arm and shoulder pain and maybe some chest pain. Different from her angina and she thinks maybe she just slept wrong. But while thinking about it and being nervous about it she had palpitations that lasted for maybe 15 to 20 minutes. She did not have shortness of breath dizziness or worsening chest pain with that. The palpitations are gone. The patient was given an appointment to come in later today but she is very scared about the coronavirus and anxious about coming in. Currently her arm pain has resolved as well. After long discussion we elected to have her stay home and relax and only come in if the symptoms recur.  \par May 26, 2020.  Patient here in follow-up.  No more of the arm pain mentioned above.  Doing well overall just hard to deal with the stress of COVID-19 and her 's mild but progressing dementia.  Denies chest pain shortness of breath palpitations.  A little orthostatic lightheadedness.  EKG is sinus rhythm at 62 and unremarkable and unchanged.\par September 23, 2020.  Patient here in follow-up and for echocardiogram.  She turned 89.  She did gain 5 pounds.  Doing well but a lot of stress with her  because of his mild dementia.  Her echocardiogram is for the most part unchanged from 2 years ago.  She has normal LV function and she has only mild to moderate MR and moderate aortic stenosis and tricuspid regurgitation.  RVSP is 44 with normal RV function.\par January 26, 2021.  Patient here in follow-up.  Doing well although her  now hears voices.  She started eating some cake from family celebrations and noticed that her sugar the next morning was still normal so she kept on eating cake and has gained 6 pounds.  She also blames it on inactivity from Covid.  No cardiac symptoms.  EKG is sinus bradycardia and unchanged.  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  Patient now here with her  in follow-up.  Her walking is a little harder and Dr. Rosenstein of neurology is sending her for physical therapy.  No chest pain or shortness of breath.  Has lost 2 pounds.  No interval medical issues.  Sinus rhythm with APCs and otherwise unchanged.  Having some constipation, possibly from diltiazem.\par September 1, 2021.  Patient here in follow-up.  Has been going for physical therapy but has definitely been noticing some new symptoms.  She is not a great descriptor or historian but there is clearly shortness of breath with exertion although maybe not chest pain or pressure.  She is not clear if this is similar to a different from when she had her stents.  After long discussion and review of the options and after her exam and ECG we decided to first try a low-level stress echo and if she cannot do enough exercise to make a diagnosis she would then need a pharmacologic nuclear study.\par October 13, 2021. Different sx, now non-exertional SOB associated with trouble swallowing. Scheduled for stress in 2 weeks. She will call GI and I would like her to try PPI for now. If no change will come in. \par October 14, 2021.  JUMA ACOSTA - 14 Oct 2021 2:46 PM \par   TASK CREATED\par Ptaient would like to let you know that she is feeling much better and would see you on 10/27/21 \par October 27, 2021.Patient came for stress echo but it was clear she would not be able to walk on the treadmill so a plain echo was done first.  It demonstrated mild to moderate MR and mild MS.  Minimal AS and no AI.  Severe LAE. Normal LV size and function with LVEF 64%.  Normal RV size and function with mild to moderate TR and RVSP 42. Patient will be scheduled for pharmacologic nuclear study. \par October 28, 2021.  Reviewed echo with patient.  A lot of things going on now including taking her  for an endoscopy.  She claims that those symptoms have resolved and she really is feeling fine.  She promises that if she has any recurrence of the symptoms she will call and schedule be pharmacologic nuclear study but for now she would like to hold off which is reasonable as long as she remains honest with herself and her symptoms.\par November 17, 2021.Had pharmacologic nuclear study on the 16th.  There was some artifact but otherwise normal perfusion scan with no evidence of infarction or ischemia and LVEF greater than 70% with no wall motion abnormalities.  Left message for patient \par July 5, 2022.  First visit since last year.  Main issue has been constipation and she is back-and-forth with her GI doctors trying to come up with a good regimen.  Otherwise no cardiac complaints, no chest pain or shortness of breath, no COVID issues.  Only issue is her 's slow deterioration and dementia.

## 2022-07-06 ENCOUNTER — RX RENEWAL (OUTPATIENT)
Age: 87
End: 2022-07-06

## 2022-07-06 LAB
ALBUMIN SERPL ELPH-MCNC: 4.3 G/DL
ALP BLD-CCNC: 79 U/L
ALT SERPL-CCNC: 7 U/L
ANION GAP SERPL CALC-SCNC: 13 MMOL/L
AST SERPL-CCNC: 15 U/L
BASOPHILS # BLD AUTO: 0.03 K/UL
BASOPHILS NFR BLD AUTO: 0.5 %
BILIRUB SERPL-MCNC: 0.3 MG/DL
BUN SERPL-MCNC: 22 MG/DL
CALCIUM SERPL-MCNC: 9.7 MG/DL
CHLORIDE SERPL-SCNC: 101 MMOL/L
CHOLEST SERPL-MCNC: 125 MG/DL
CO2 SERPL-SCNC: 24 MMOL/L
CREAT SERPL-MCNC: 0.77 MG/DL
EGFR: 73 ML/MIN/1.73M2
EOSINOPHIL # BLD AUTO: 0.03 K/UL
EOSINOPHIL NFR BLD AUTO: 0.5 %
ESTIMATED AVERAGE GLUCOSE: 128 MG/DL
GLUCOSE SERPL-MCNC: 94 MG/DL
HBA1C MFR BLD HPLC: 6.1 %
HCT VFR BLD CALC: 35 %
HDLC SERPL-MCNC: 47 MG/DL
HGB BLD-MCNC: 11 G/DL
IMM GRANULOCYTES NFR BLD AUTO: 0.2 %
LDLC SERPL CALC-MCNC: 58 MG/DL
LYMPHOCYTES # BLD AUTO: 2.02 K/UL
LYMPHOCYTES NFR BLD AUTO: 31.1 %
MAN DIFF?: NORMAL
MCHC RBC-ENTMCNC: 26.8 PG
MCHC RBC-ENTMCNC: 31.4 GM/DL
MCV RBC AUTO: 85.2 FL
MONOCYTES # BLD AUTO: 0.6 K/UL
MONOCYTES NFR BLD AUTO: 9.2 %
NEUTROPHILS # BLD AUTO: 3.81 K/UL
NEUTROPHILS NFR BLD AUTO: 58.5 %
NONHDLC SERPL-MCNC: 78 MG/DL
NT-PROBNP SERPL-MCNC: 427 PG/ML
PLATELET # BLD AUTO: 229 K/UL
POTASSIUM SERPL-SCNC: 4.6 MMOL/L
PROT SERPL-MCNC: 6.7 G/DL
RBC # BLD: 4.11 M/UL
RBC # FLD: 15.4 %
SODIUM SERPL-SCNC: 137 MMOL/L
TRIGL SERPL-MCNC: 97 MG/DL
TSH SERPL-ACNC: 2.93 UIU/ML
WBC # FLD AUTO: 6.5 K/UL

## 2022-07-25 ENCOUNTER — RX RENEWAL (OUTPATIENT)
Age: 87
End: 2022-07-25

## 2022-09-20 ENCOUNTER — NON-APPOINTMENT (OUTPATIENT)
Age: 87
End: 2022-09-20

## 2022-10-06 ENCOUNTER — APPOINTMENT (OUTPATIENT)
Dept: SURGICAL ONCOLOGY | Facility: CLINIC | Age: 87
End: 2022-10-06

## 2022-10-06 VITALS
TEMPERATURE: 98 F | SYSTOLIC BLOOD PRESSURE: 132 MMHG | WEIGHT: 175 LBS | HEIGHT: 61.5 IN | HEART RATE: 58 BPM | DIASTOLIC BLOOD PRESSURE: 81 MMHG | RESPIRATION RATE: 16 BRPM | OXYGEN SATURATION: 97 % | BODY MASS INDEX: 32.62 KG/M2

## 2022-10-06 PROCEDURE — 99214 OFFICE O/P EST MOD 30 MIN: CPT

## 2022-10-06 NOTE — PHYSICAL EXAM
[Normal] : supple, no neck mass and thyroid not enlarged [Normal Supraclavicular Lymph Nodes] : normal supraclavicular lymph nodes [Normal Axillary Lymph Nodes] : normal axillary lymph nodes [Normal] : oriented to person, place and time, with appropriate affect [de-identified] : old right lateral scar but no masses

## 2022-10-06 NOTE — ASSESSMENT
[FreeTextEntry1] : IMP\par Right breast invasive ductal s/p lumpectomy 2012 - [place] \par on Arimidex 1 mg daily\par \par Labs 3/2022- WNL\par Mammo/sono 8/2022 - BIRADS 1\par \par Plan:\par Labs now\par RTO Q6 months with bloodwork\par

## 2022-10-06 NOTE — HISTORY OF PRESENT ILLNESS
[de-identified] : Alee Velasquez is a 89 year old patient presenting for a follow-up visit \par \par HX; RIGHT breast cancer; s/p RIGHT lumpectomy with sentinel lymph node biopsy in 4/2012, path revealed T1N0 invasive ductal carcinoma, ER/[place]+, HER2-;Oncotype Dx= 1, s/p radiation therapy. Patient is currently on Arimidex 1 mg PO daily with no complaints. \par \par A-fib and is on Xarelto. \par \par \par Labs 3/2022- WNL\par Mammo/sono 8/2022 - BIRADS 1\par \par \par \par \par Internist: Dr. Do. \par  none

## 2022-10-20 ENCOUNTER — RX RENEWAL (OUTPATIENT)
Age: 87
End: 2022-10-20

## 2022-11-11 ENCOUNTER — NON-APPOINTMENT (OUTPATIENT)
Age: 87
End: 2022-11-11

## 2022-12-08 ENCOUNTER — APPOINTMENT (OUTPATIENT)
Dept: CARDIOLOGY | Facility: CLINIC | Age: 87
End: 2022-12-08

## 2022-12-08 ENCOUNTER — NON-APPOINTMENT (OUTPATIENT)
Age: 87
End: 2022-12-08

## 2022-12-08 VITALS
WEIGHT: 166 LBS | SYSTOLIC BLOOD PRESSURE: 113 MMHG | DIASTOLIC BLOOD PRESSURE: 67 MMHG | HEART RATE: 60 BPM | BODY MASS INDEX: 30.86 KG/M2 | OXYGEN SATURATION: 95 %

## 2022-12-08 DIAGNOSIS — I10 ESSENTIAL (PRIMARY) HYPERTENSION: ICD-10-CM

## 2022-12-08 PROCEDURE — 93000 ELECTROCARDIOGRAM COMPLETE: CPT

## 2022-12-08 PROCEDURE — 99214 OFFICE O/P EST MOD 30 MIN: CPT

## 2022-12-08 PROCEDURE — 36415 COLL VENOUS BLD VENIPUNCTURE: CPT

## 2022-12-08 NOTE — HISTORY OF PRESENT ILLNESS
[FreeTextEntry1] : (MED HX) Patient with known coronary disease with stents to her circumflex artery and obtuse marginal going back to 2006 and 2008. Normal stress thallium in 2011 before surgery. Symptoms of esophageal reflux on and off that have been different than her angina. However in August, 2012 she had a return of her usual angina which initially responded to bisoprolol but then her symptoms increased and the patient called me on September 7. She was scheduled for a cardiac catheterization on September 10 and found to have a subtotal occlusion of her circumflex. This was treated with a drug-eluting stent and she was sent home on the 11th on aspirin and Plavix and she came  for followup 9/28/12. Since being home she has not had any of those same symptoms and is able to walk up the stairs now without shortness of breath or chest pain.\par She was here in January, 2013 for clearance for cataract surgery. She had no complications and was doing well since then other than having trouble maintaining her weight as she goes to [employer] and her  is able to eat cake and other foods without gaining any weight. She denies chest pain or shortness of breath with exertion, although when she lies down in bed she says she feels short of breath briefly.\par August 19, 2013. Here for followup and has no real complaints. She did have some pains in the legs that have been getting better with physical therapy. She denies exertional chest pain or shortness of breath. She is still having trouble losing weight. All seemed acceptable. She had labs with Dr. Do in October. BUN was slightly elevated and the sodium was 131 so he was asking me about cutting back on the diuretic. In addition she was anemic with an MCV of 80.\par January 15, 2014. The patient is here for followup. Her main issue is that she may need carpal tunnel surgery on both sides. She has some atypical pain in the upper left chest and neck area that is nonexertional and sounds more arthritic or musculoskeletal. She denies chest pain or shortness of breath. She was pleased to find out that based on the scale here she has lost 9 pounds.\par Tatiana 10, 2014. The patient is here for followup. She didn't need the carpal tunnel surgery but instead responded well to a steroid injection. However she developed bursitis in her right shoulder and had a steroid shot that just lasted for about a month. Now she is finishing up with physical therapy. Her main complaint is that she just cannot walk like she used to; she just kind of hits a wall and has to slow down. She denies exertional chest pain or shortness of breath and no definite leg pain but thinks it could be her arthritis. She claims her weight is about the same. She has had no change in her medications recently.\par The patient saw Dr. Geronimo in September for preop evaluation and she underwent carpal tunnel surgery on October 2. No complications and she saw Dr. Jaylan Christianson in followup.\par November 14, 2014. The patient is here in followup. When she lies down at night she gets pressure in her chest which feels similar to her previous symptoms and she was worried she might need another stent however she tries to see what happens when she walks up and down the stairs and has absolutely no symptoms whatsoever. Upon further questioning it seems that she was told to go back on the aspirin in addition to the Plavix by the presurgical testing people before the carpal tunnel surgery and it is only since being back on the aspirin that she has these symptoms. There is no shortness of breath. She is having trouble losing weight. I felt fairly certain that her symptoms were reflux and related to the aspirin. The aspirin was stopped and the AcipHex was increased to b.i.d.\par March 23, 2015. Patient is here for followup. On the whole she has been doing well since her last visit without recurrence of her chest pain or shortness of breath. However about 2-3 months ago she had a syncopal episode when she got up from the table and before she realized it she was on the floor. When she came to she was a little shaky but otherwise was okay. No recurrence. The next day she was in pain in her joints and was worried about her knee so  she was checked out by orthopedics and everything was fine. She has had no orthostatic symptoms since then and no lightheadedness or near syncope since then. She is on a diuretic every other day and she does not recall being ill around that time or any reason why she might have been dehydrated. She is on 5 mg of bisoprolol but has had no other episodes of syncope or near syncope in the last 2 months. Workup was unremarkable\par July 29, 2015. The patient is here in followup. She is complaining that her legs and arms feel heavy as if she's walking in sand or water. She is borderline hypotensive and it could just be because of the heat and volume depletion but the patient has not lost any weight and she claims that the symptoms have gone on for a few months not just recently when it's been hot. She denies chest pain or exertional shortness of breath. She has been having some trouble sleeping as well. Her EKG is totally within normal limits. I elected to hold her valsartan and then check labs for kidney function looking for volume depletion as to whether we should cut back on the diuretic more than just every other day. The possibility includes whether these are side effects of statin. No recurrence of her syncope. \par November 12, 2015. The patient is here for followup. She is now off Diovan because of her orthostatic symptoms and these seemed to have improved. She has no specific complaints. She is asking about aspirin versus Plavix, and she has remained just on Plavix most of this time. She does have esophageal reflux and is under treatment and under control. She saw Dr. Olmos in followup for her breast cancer and everything is fine and she is to see him again in 6 months. She denies exertional chest pain or shortness of breath. Her weight is still an issue.\par March 9, 2016. The patient is here for followup. Has been under a fair amount of stress as well as increased physical activity around the house with her 's intracerebral hemorrhage. He is improving greatly which has lessened her stress somewhat. She does not complain of chest pain or shortness of breath. She has an upcoming followup with oncologist and that'll be four years so far since her cancer surgery.\par June 16, 2016. Patient is here for followup with a little stress as her  is doing a little better, but she's been having episodes of trouble with her balance. No vertigo. No lightheadedness, syncope, or near syncope. She can't even feel unbalanced while sitting. Usually can last an hour but can function somewhat. Happens about once a week. Does not seem to be related to food or medication.\par October 13, 2016. Patient is here to follow up with her . They celebrated their 65th wedding anniversary. She is doing about the same, with some stress. Had a workup fpr vertebrobasilar insufficiency. She has numbness in both feet, but not in her hands and she will be having an EMG coming up soon.\par January 10, 2017. Patient here for followup. Not feeling well for about a month or so. Has chest pain and back pain when she takes a deep breath, but not with exertion. Needs laser surgery on her eyes complains about her body from feet to her head etc. Never did start the B12 injections that Dr. Rosenstein wanted. She is worried she might need another stent but again no exertional component.\par May 16, 2017. Patient here in followup. No chest pain or shortness of breath. Tripped on the carpet and got an infection in the knee, where she had her knee replacement, but she was able to treated with oral antibiotics and is just about finished. Her weight is up she thinks. She is very concerned about her , who seems to be a little bit more unbalanced and has a little bit more memory difficulties secondary to his intracerebral hemorrhage surgery and obviously getting older as well. No change in any of her medication.\par September 12, 2017. Patient is here in followup. She has been diagnosed with a peripheral neuropathy and is getting B12 injections. Her legs get numb when she flexes them up towards herself. Meanwhile, she is not walking much, so she is gaining a lot of weight. No chest pain, no shortness of breath, but is not as active as she used to be.\par January 9, 2018. The patient is here in followup. She had a six-month breast cancer followup in September, and everything checked out okay. She denies any increase in exertional chest pain or shortness of breath. There is a fair amount of stress from her 's memory and her 22-year-old granddaughter who is living with them. No change in medications.\par May 8, 2018. Patient here in followup. Doing well except gaining weight, not losing. Hed CAT scan showing coronary artery calcifications, and she was very concerned about the report, but I explained it is a normal finding. At this age and does not necessarily correlate with coronary artery disease. The report also mentioned enlarged heart and mitral valve calcification. No recent echo. Here her blood pressure is excellent. EKG unchanged.\par May 14, 2018. Returned for echocardiogram. Moderate MR. Mildly calcified aortic valve. Mild LAE. Normal LV function with concentric remodeling and mild diastolic dysfunction. Mild SYDNEE, with normal RV size and function. Moderate to severe TR with RVSP 44.\par September 13, 2018. Patient here to followup with her . No real complaints. Still, dyspnea on exertion going up stairs, but no chest pressure, and no change. VPCs on EKG, and on exam. Asymptomatic.\par October 29, 2018. On the 19th patient went to GI for colitis type complaints and was found to be in rapid A. fib. Sent to ER at [facility]. Converted with Cardizem and was started on Xarelto and discharged. I spoke with her on the 24th and arranged to see her today. In the meantime over the weekend she was readmitted to [facility] because of palpitations although not clear that she was in atrial fibrillation. She is here today in sinus rhythm with very frequent APCs. Quadrigeminy. For some reason she stopped her other medications, including her beta blocker her diuretic her statin and Plavix. She is also on metronidazole and Cipro because of her colitis.\par November 19, 2018. Patient is here in followup and for a clearance for a sigmoid biopsy and colonoscopy by Dr. Jonny Connor. The patient remains in sinus rhythm on sotalol. She is asymptomatic. She is somewhat nervous about the anesthesia and procedure. It is scheduled for November 29.She had been on 15 mg of Xarelto from the hospital, but based on her kidney function should be on 20 mg. If she stops the anticoagulation she does need to take baby aspirin because of her cardiac stent.\par January 18, 2019. Patient here in followup. Had a chest x-ray around January 4 and was nervous because he reports that prominent pulmonary arteries, possible pulmonary hypertension. Patient is here and remains in sinus rhythm. No signs of pulmonary hypertension on her EKG. (Echo in May, 2018 did have moderate TR with RVSP 44.) A little depressed over her macular degeneration and now she can't drive etc. otherwise no cardiac complaints. Weight is stable.\par July 26, 2019. Patient here in followup. Remains in sinus rhythm. Saw Dr. Rosenstein for workup of her peripheral neuropathy. He told her she was diabetic, although her hemoglobin A1c was 6.4 in June, which is where has been her last 3 visits here. In fact, she stopped eating cake and now has lost 12 pounds. By the same token, she could have a diabetic neuropathy, even before the full blown diagnosis of diabetes. In any case, she seems to be fairly stable, although a lot of stress because of her , as well as her own macular degeneration. She is also now on B12 shots. Her labs were also notable for a weekly migrating paraprotein that could not be characterized and homogenous JESSE at 1:320.\par November 8, 2019.  Patient here in follow-up.  "I am diabetic now".  She is on metformin 1000 and day and tolerating it.  She cut out cake and cookies and has lost 6 pounds since July and 18 pounds since January.  Only stress is regarding her .  Had a lump in her arm that turned out to be a biceps tear and she may be going to physical therapy.  Had a flu shot already.\par March 30, 2020.  Patient awoke with atypical left arm and shoulder pain and maybe some chest pain. Different from her angina and she thinks maybe she just slept wrong. But while thinking about it and being nervous about it she had palpitations that lasted for maybe 15 to 20 minutes. She did not have shortness of breath dizziness or worsening chest pain with that. The palpitations are gone. The patient was given an appointment to come in later today but she is very scared about the coronavirus and anxious about coming in. Currently her arm pain has resolved as well. After long discussion we elected to have her stay home and relax and only come in if the symptoms recur.  \par May 26, 2020.  Patient here in follow-up.  No more of the arm pain mentioned above.  Doing well overall just hard to deal with the stress of COVID-19 and her 's mild but progressing dementia.  Denies chest pain shortness of breath palpitations.  A little orthostatic lightheadedness.  EKG is sinus rhythm at 62 and unremarkable and unchanged.\par September 23, 2020.  Patient here in follow-up and for echocardiogram.  She turned 89.  She did gain 5 pounds.  Doing well but a lot of stress with her  because of his mild dementia.  Her echocardiogram is for the most part unchanged from 2 years ago.  She has normal LV function and she has only mild to moderate MR and moderate aortic stenosis and tricuspid regurgitation.  RVSP is 44 with normal RV function.\par January 26, 2021.  Patient here in follow-up.  Doing well although her  now hears voices.  She started eating some cake from family celebrations and noticed that her sugar the next morning was still normal so she kept on eating cake and has gained 6 pounds.  She also blames it on inactivity from Covid.  No cardiac symptoms.  EKG is sinus bradycardia and unchanged.  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  Patient now here with her  in follow-up.  Her walking is a little harder and Dr. Rosenstein of neurology is sending her for physical therapy.  No chest pain or shortness of breath.  Has lost 2 pounds.  No interval medical issues.  Sinus rhythm with APCs and otherwise unchanged.  Having some constipation, possibly from diltiazem.\par September 1, 2021.  Patient here in follow-up.  Has been going for physical therapy but has definitely been noticing some new symptoms.  She is not a great descriptor or historian but there is clearly shortness of breath with exertion although maybe not chest pain or pressure.  She is not clear if this is similar to a different from when she had her stents.  After long discussion and review of the options and after her exam and ECG we decided to first try a low-level stress echo and if she cannot do enough exercise to make a diagnosis she would then need a pharmacologic nuclear study.\par October 13, 2021. Different sx, now non-exertional SOB associated with trouble swallowing. Scheduled for stress in 2 weeks. She will call GI and I would like her to try PPI for now. If no change will come in. \par October 14, 2021.  JUMA ACOSTA - 14 Oct 2021 2:46 PM \par   TASK CREATED\par Ptaient would like to let you know that she is feeling much better and would see you on 10/27/21 \par October 27, 2021.Patient came for stress echo but it was clear she would not be able to walk on the treadmill so a plain echo was done first.  It demonstrated mild to moderate MR and mild MS.  Minimal AS and no AI.  Severe LAE. Normal LV size and function with LVEF 64%.  Normal RV size and function with mild to moderate TR and RVSP 42. Patient will be scheduled for pharmacologic nuclear study. \par October 28, 2021.  Reviewed echo with patient.  A lot of things going on now including taking her  for an endoscopy.  She claims that those symptoms have resolved and she really is feeling fine.  She promises that if she has any recurrence of the symptoms she will call and schedule be pharmacologic nuclear study but for now she would like to hold off which is reasonable as long as she remains honest with herself and her symptoms.\par November 17, 2021.Had pharmacologic nuclear study on the 16th.  There was some artifact but otherwise normal perfusion scan with no evidence of infarction or ischemia and LVEF greater than 70% with no wall motion abnormalities.  Left message for patient \par July 5, 2022.  First visit since last year.  Main issue has been constipation and she is back-and-forth with her GI doctors trying to come up with a good regimen.  Otherwise no cardiac complaints, no chest pain or shortness of breath, no COVID issues.  Only issue is her 's slow deterioration and dementia.\par September 20, 2022. Patient called.  A lot of muscle aches shoulders upper arms and her internist said it could be from the simvastatin.  I discussed with the patient recent studies that really showed with no different than placebo and that especially if you have been on it for a long time it is extremely unlikely that these new symptoms are from the simvastatin.  I did mention I would be willing to give a trial off of it or switching to a different statin but after that discussion she did not want to change.  She will try to tough it out and see what happens.  In addition she mentioned that she was hospitalized at [place] at the beginning of August and I was never notified and there was a question of an aneurysm and an angiogram was done and it was not an aneurysm.  I will try to obtain the records from the hospital.\par Checked [facility] computer system and no admission for the patient in there \par December 8, 2022.  Patient returns in follow-up with her .  She says "I am still here" now 89 years old.  However complaining about severe neuropathy and arthritis pains without much relief and is considering medical marijuana.  She will be seeing a pain specialist about it.  No angina palpitations shortness of breath etc.  Still stress helping with her  but he seems to be relatively stable.  Her EKG is sinus rhythm with APCs and some J-point elevation V1 to 3, overall no change.  Blood pressure is excellent.  Weight is down maybe 10 pounds.

## 2022-12-08 NOTE — REVIEW OF SYSTEMS
[Weight Loss (___ Lbs)] : [unfilled] ~Ulb weight loss [Hearing Loss] : hearing loss [Dyspnea on exertion] : dyspnea during exertion [Chest Discomfort] : no chest discomfort [Lower Ext Edema] : no extremity edema [Joint Pain] : joint pain [Under Stress] : under stress [Negative] : Heme/Lymph [FreeTextEntry3] : Macular degeneration, almost legally blind. [de-identified] : Stress of her 's dementia

## 2022-12-08 NOTE — PHYSICAL EXAM
[General Appearance - Well Developed] : well developed [Normal Appearance] : normal appearance [Well Groomed] : well groomed [General Appearance - Well Nourished] : well nourished [No Deformities] : no deformities [General Appearance - In No Acute Distress] : no acute distress [Normal Conjunctiva] : the conjunctiva exhibited no abnormalities [Eyelids - No Xanthelasma] : the eyelids demonstrated no xanthelasmas [Normal Oral Mucosa] : normal oral mucosa [No Oral Pallor] : no oral pallor [No Oral Cyanosis] : no oral cyanosis [Normal Jugular Venous A Waves Present] : normal jugular venous A waves present [Normal Jugular Venous V Waves Present] : normal jugular venous V waves present [No Jugular Venous Epps A Waves] : no jugular venous epps A waves [Respiration, Rhythm And Depth] : normal respiratory rhythm and effort [Exaggerated Use Of Accessory Muscles For Inspiration] : no accessory muscle use [Auscultation Breath Sounds / Voice Sounds] : lungs were clear to auscultation bilaterally [Heart Rate And Rhythm] : heart rate and rhythm were normal [Heart Sounds] : normal S1 and S2 [Murmurs] : no murmurs present [Abdomen Soft] : soft [Abdomen Tenderness] : non-tender [Abdomen Mass (___ Cm)] : no abdominal mass palpated [Abnormal Walk] : normal gait [Gait - Sufficient For Exercise Testing] : the gait was sufficient for exercise testing [Nail Clubbing] : no clubbing of the fingernails [Cyanosis, Localized] : no localized cyanosis [Petechial Hemorrhages (___cm)] : no petechial hemorrhages [FreeTextEntry1] : Trace ankle edema (just "fat" ankles). Pedal pulses intact. Pulses symmetric, and no pulse deficiits [Skin Color & Pigmentation] : normal skin color and pigmentation [] : no rash [No Venous Stasis] : no venous stasis [Skin Lesions] : no skin lesions [No Skin Ulcers] : no skin ulcer [No Xanthoma] : no  xanthoma was observed [Oriented To Time, Place, And Person] : oriented to person, place, and time [Affect] : the affect was normal [Mood] : the mood was normal [No Anxiety] : not feeling anxious

## 2022-12-09 DIAGNOSIS — D64.9 ANEMIA, UNSPECIFIED: ICD-10-CM

## 2022-12-09 LAB
ALBUMIN SERPL ELPH-MCNC: 3.9 G/DL
ALP BLD-CCNC: 79 U/L
ALT SERPL-CCNC: 5 U/L
ANION GAP SERPL CALC-SCNC: 14 MMOL/L
AST SERPL-CCNC: 10 U/L
BASOPHILS # BLD AUTO: 0.04 K/UL
BASOPHILS NFR BLD AUTO: 0.6 %
BILIRUB SERPL-MCNC: 0.3 MG/DL
BUN SERPL-MCNC: 25 MG/DL
CALCIUM SERPL-MCNC: 10 MG/DL
CHLORIDE SERPL-SCNC: 98 MMOL/L
CHOLEST SERPL-MCNC: 118 MG/DL
CO2 SERPL-SCNC: 23 MMOL/L
CREAT SERPL-MCNC: 0.78 MG/DL
EGFR: 71 ML/MIN/1.73M2
EOSINOPHIL # BLD AUTO: 0.04 K/UL
EOSINOPHIL NFR BLD AUTO: 0.6 %
ESTIMATED AVERAGE GLUCOSE: 128 MG/DL
GLUCOSE SERPL-MCNC: 93 MG/DL
HBA1C MFR BLD HPLC: 6.1 %
HCT VFR BLD CALC: 32.9 %
HDLC SERPL-MCNC: 49 MG/DL
HGB BLD-MCNC: 10.2 G/DL
IMM GRANULOCYTES NFR BLD AUTO: 0.5 %
LDLC SERPL CALC-MCNC: 54 MG/DL
LYMPHOCYTES # BLD AUTO: 1.61 K/UL
LYMPHOCYTES NFR BLD AUTO: 25.5 %
MAN DIFF?: NORMAL
MCHC RBC-ENTMCNC: 26.2 PG
MCHC RBC-ENTMCNC: 31 GM/DL
MCV RBC AUTO: 84.4 FL
MONOCYTES # BLD AUTO: 0.64 K/UL
MONOCYTES NFR BLD AUTO: 10.1 %
NEUTROPHILS # BLD AUTO: 3.96 K/UL
NEUTROPHILS NFR BLD AUTO: 62.7 %
NONHDLC SERPL-MCNC: 70 MG/DL
NT-PROBNP SERPL-MCNC: 539 PG/ML
PLATELET # BLD AUTO: 287 K/UL
POTASSIUM SERPL-SCNC: 4.7 MMOL/L
PROT SERPL-MCNC: 6.5 G/DL
RBC # BLD: 3.9 M/UL
RBC # FLD: 15.5 %
SODIUM SERPL-SCNC: 135 MMOL/L
TRIGL SERPL-MCNC: 76 MG/DL
VIT B12 SERPL-MCNC: 1232 PG/ML
WBC # FLD AUTO: 6.32 K/UL

## 2022-12-13 LAB
FERRITIN SERPL-MCNC: 143 NG/ML
IRON SATN MFR SERPL: 14 %
IRON SERPL-MCNC: 46 UG/DL
TIBC SERPL-MCNC: 321 UG/DL
UIBC SERPL-MCNC: 274 UG/DL

## 2022-12-23 ENCOUNTER — RX RENEWAL (OUTPATIENT)
Age: 87
End: 2022-12-23

## 2023-01-07 ENCOUNTER — RX RENEWAL (OUTPATIENT)
Age: 88
End: 2023-01-07

## 2023-01-08 ENCOUNTER — RX RENEWAL (OUTPATIENT)
Age: 88
End: 2023-01-08

## 2023-02-03 ENCOUNTER — NON-APPOINTMENT (OUTPATIENT)
Age: 88
End: 2023-02-03

## 2023-03-27 ENCOUNTER — RX RENEWAL (OUTPATIENT)
Age: 88
End: 2023-03-27

## 2023-04-06 ENCOUNTER — APPOINTMENT (OUTPATIENT)
Dept: SURGICAL ONCOLOGY | Facility: CLINIC | Age: 88
End: 2023-04-06
Payer: MEDICARE

## 2023-04-06 VITALS
BODY MASS INDEX: 30.94 KG/M2 | DIASTOLIC BLOOD PRESSURE: 75 MMHG | HEIGHT: 61.5 IN | SYSTOLIC BLOOD PRESSURE: 109 MMHG | OXYGEN SATURATION: 98 % | HEART RATE: 101 BPM | RESPIRATION RATE: 17 BRPM | WEIGHT: 166 LBS

## 2023-04-06 DIAGNOSIS — Z85.3 ENCOUNTER FOR FOLLOW-UP EXAMINATION AFTER COMPLETED TREATMENT FOR MALIGNANT NEOPLASM: ICD-10-CM

## 2023-04-06 DIAGNOSIS — Z08 ENCOUNTER FOR FOLLOW-UP EXAMINATION AFTER COMPLETED TREATMENT FOR MALIGNANT NEOPLASM: ICD-10-CM

## 2023-04-06 PROCEDURE — 99214 OFFICE O/P EST MOD 30 MIN: CPT

## 2023-04-06 NOTE — PHYSICAL EXAM
[Normal] : supple, no neck mass and thyroid not enlarged [Normal Supraclavicular Lymph Nodes] : normal supraclavicular lymph nodes [Normal Axillary Lymph Nodes] : normal axillary lymph nodes [Normal] : oriented to person, place and time, with appropriate affect [de-identified] : fibrocystic changes and scar in right lumpectomy site but no masses

## 2023-04-06 NOTE — ASSESSMENT
[FreeTextEntry1] : IMP\par Right breast invasive ductal s/p lumpectomy 2012 - [place] \par on Arimidex 1 mg daily\par \par Labs 3/2022- WNL\par Mammo/sono 8/2022 - BIRADS 1\par \par Plan:\par Labs now\par Mamm/sono August 2023\par RTO Q6 months with bloodwork\par

## 2023-04-06 NOTE — HISTORY OF PRESENT ILLNESS
[de-identified] : Alee Velasquez is a 89 year old patient presenting for a follow-up visit \par \par HX; RIGHT breast cancer; s/p RIGHT lumpectomy with sentinel lymph node biopsy in 4/2012, path revealed T1N0 invasive ductal carcinoma, ER/ND+, HER2-;Oncotype Dx= 1, s/p radiation therapy. Patient is currently on Arimidex 1 mg PO daily with no complaints. \par \par A-fib and is on Xarelto. \par \par \par Labs 3/2022- WNL\par Mammo/sono 8/2022 - BIRADS 1\par \par \par \par \par Internist: Dr. Do. \par

## 2023-05-17 ENCOUNTER — RX RENEWAL (OUTPATIENT)
Age: 88
End: 2023-05-17

## 2023-05-19 ENCOUNTER — RX RENEWAL (OUTPATIENT)
Age: 88
End: 2023-05-19

## 2023-06-26 ENCOUNTER — LABORATORY RESULT (OUTPATIENT)
Age: 88
End: 2023-06-26

## 2023-06-26 ENCOUNTER — APPOINTMENT (OUTPATIENT)
Dept: CARDIOLOGY | Facility: CLINIC | Age: 88
End: 2023-06-26
Payer: MEDICARE

## 2023-06-26 ENCOUNTER — NON-APPOINTMENT (OUTPATIENT)
Age: 88
End: 2023-06-26

## 2023-06-26 VITALS
DIASTOLIC BLOOD PRESSURE: 60 MMHG | SYSTOLIC BLOOD PRESSURE: 98 MMHG | WEIGHT: 160 LBS | OXYGEN SATURATION: 98 % | BODY MASS INDEX: 31.41 KG/M2 | HEART RATE: 60 BPM | HEIGHT: 60 IN

## 2023-06-26 DIAGNOSIS — G62.9 POLYNEUROPATHY, UNSPECIFIED: ICD-10-CM

## 2023-06-26 PROCEDURE — 36415 COLL VENOUS BLD VENIPUNCTURE: CPT

## 2023-06-26 PROCEDURE — 99215 OFFICE O/P EST HI 40 MIN: CPT

## 2023-06-26 PROCEDURE — 93000 ELECTROCARDIOGRAM COMPLETE: CPT

## 2023-06-26 NOTE — REVIEW OF SYSTEMS
[Hearing Loss] : hearing loss [Dyspnea on exertion] : dyspnea during exertion [Joint Pain] : joint pain [Under Stress] : under stress [Negative] : Heme/Lymph [Weight Loss (___ Lbs)] : [unfilled] ~Ulb weight loss [Chest Discomfort] : no chest discomfort [Lower Ext Edema] : no extremity edema [FreeTextEntry3] : Macular degeneration, almost legally blind. [de-identified] : Stress of her 's dementia

## 2023-06-26 NOTE — PHYSICAL EXAM
[General Appearance - Well Developed] : well developed [Normal Appearance] : normal appearance [Well Groomed] : well groomed [General Appearance - Well Nourished] : well nourished [No Deformities] : no deformities [General Appearance - In No Acute Distress] : no acute distress [Normal Conjunctiva] : the conjunctiva exhibited no abnormalities [Eyelids - No Xanthelasma] : the eyelids demonstrated no xanthelasmas [Normal Oral Mucosa] : normal oral mucosa [No Oral Pallor] : no oral pallor [No Oral Cyanosis] : no oral cyanosis [Normal Jugular Venous A Waves Present] : normal jugular venous A waves present [Normal Jugular Venous V Waves Present] : normal jugular venous V waves present [No Jugular Venous Epps A Waves] : no jugular venous epps A waves [Respiration, Rhythm And Depth] : normal respiratory rhythm and effort [Exaggerated Use Of Accessory Muscles For Inspiration] : no accessory muscle use [Auscultation Breath Sounds / Voice Sounds] : lungs were clear to auscultation bilaterally [Heart Rate And Rhythm] : heart rate and rhythm were normal [Heart Sounds] : normal S1 and S2 [Murmurs] : no murmurs present [Abdomen Soft] : soft [Abdomen Tenderness] : non-tender [Abdomen Mass (___ Cm)] : no abdominal mass palpated [Abnormal Walk] : normal gait [Gait - Sufficient For Exercise Testing] : the gait was sufficient for exercise testing [Nail Clubbing] : no clubbing of the fingernails [Cyanosis, Localized] : no localized cyanosis [Petechial Hemorrhages (___cm)] : no petechial hemorrhages [Skin Color & Pigmentation] : normal skin color and pigmentation [] : no rash [Skin Lesions] : no skin lesions [No Venous Stasis] : no venous stasis [No Skin Ulcers] : no skin ulcer [No Xanthoma] : no  xanthoma was observed [Oriented To Time, Place, And Person] : oriented to person, place, and time [Affect] : the affect was normal [Mood] : the mood was normal [No Anxiety] : not feeling anxious [FreeTextEntry1] : 1+ ankle edema (just "fat" ankles). Pedal pulses intact. Pulses symmetric, and no pulse deficiits

## 2023-06-26 NOTE — HISTORY OF PRESENT ILLNESS
[FreeTextEntry1] : (MED HX) Patient with known coronary disease with stents to her circumflex artery and obtuse marginal going back to 2006 and 2008. Normal stress thallium in 2011 before surgery. Symptoms of esophageal reflux on and off that have been different than her angina. However in August, 2012 she had a return of her usual angina which initially responded to bisoprolol but then her symptoms increased and the patient called me on September 7. She was scheduled for a cardiac catheterization on September 10 and found to have a subtotal occlusion of her circumflex. This was treated with a drug-eluting stent and she was sent home on the 11th on aspirin and Plavix and she came  for followup 9/28/12. Since being home she has not had any of those same symptoms and is able to walk up the stairs now without shortness of breath or chest pain.\par She was here in January, 2013 for clearance for cataract surgery. She had no complications and was doing well since then other than having trouble maintaining her weight as she goes to [employer] and her  is able to eat cake and other foods without gaining any weight. She denies chest pain or shortness of breath with exertion, although when she lies down in bed she says she feels short of breath briefly.\par August 19, 2013. Here for followup and has no real complaints. She did have some pains in the legs that have been getting better with physical therapy. She denies exertional chest pain or shortness of breath. She is still having trouble losing weight. All seemed acceptable. She had labs with Dr. Do in October. BUN was slightly elevated and the sodium was 131 so he was asking me about cutting back on the diuretic. In addition she was anemic with an MCV of 80.\par January 15, 2014. The patient is here for followup. Her main issue is that she may need carpal tunnel surgery on both sides. She has some atypical pain in the upper left chest and neck area that is nonexertional and sounds more arthritic or musculoskeletal. She denies chest pain or shortness of breath. She was pleased to find out that based on the scale here she has lost 9 pounds.\par Tatiana 10, 2014. The patient is here for followup. She didn't need the carpal tunnel surgery but instead responded well to a steroid injection. However she developed bursitis in her right shoulder and had a steroid shot that just lasted for about a month. Now she is finishing up with physical therapy. Her main complaint is that she just cannot walk like she used to; she just kind of hits a wall and has to slow down. She denies exertional chest pain or shortness of breath and no definite leg pain but thinks it could be her arthritis. She claims her weight is about the same. She has had no change in her medications recently.\par The patient saw Dr. Geronimo in September for preop evaluation and she underwent carpal tunnel surgery on October 2. No complications and she saw Dr. Jaylan Christianson in followup.\par November 14, 2014. The patient is here in followup. When she lies down at night she gets pressure in her chest which feels similar to her previous symptoms and she was worried she might need another stent however she tries to see what happens when she walks up and down the stairs and has absolutely no symptoms whatsoever. Upon further questioning it seems that she was told to go back on the aspirin in addition to the Plavix by the presurgical testing people before the carpal tunnel surgery and it is only since being back on the aspirin that she has these symptoms. There is no shortness of breath. She is having trouble losing weight. I felt fairly certain that her symptoms were reflux and related to the aspirin. The aspirin was stopped and the AcipHex was increased to b.i.d.\par March 23, 2015. Patient is here for followup. On the whole she has been doing well since her last visit without recurrence of her chest pain or shortness of breath. However about 2-3 months ago she had a syncopal episode when she got up from the table and before she realized it she was on the floor. When she came to she was a little shaky but otherwise was okay. No recurrence. The next day she was in pain in her joints and was worried about her knee so  she was checked out by orthopedics and everything was fine. She has had no orthostatic symptoms since then and no lightheadedness or near syncope since then. She is on a diuretic every other day and she does not recall being ill around that time or any reason why she might have been dehydrated. She is on 5 mg of bisoprolol but has had no other episodes of syncope or near syncope in the last 2 months. Workup was unremarkable\par July 29, 2015. The patient is here in followup. She is complaining that her legs and arms feel heavy as if she's walking in sand or water. She is borderline hypotensive and it could just be because of the heat and volume depletion but the patient has not lost any weight and she claims that the symptoms have gone on for a few months not just recently when it's been hot. She denies chest pain or exertional shortness of breath. She has been having some trouble sleeping as well. Her EKG is totally within normal limits. I elected to hold her valsartan and then check labs for kidney function looking for volume depletion as to whether we should cut back on the diuretic more than just every other day. The possibility includes whether these are side effects of statin. No recurrence of her syncope. \par November 12, 2015. The patient is here for followup. She is now off Diovan because of her orthostatic symptoms and these seemed to have improved. She has no specific complaints. She is asking about aspirin versus Plavix, and she has remained just on Plavix most of this time. She does have esophageal reflux and is under treatment and under control. She saw Dr. Olmos in followup for her breast cancer and everything is fine and she is to see him again in 6 months. She denies exertional chest pain or shortness of breath. Her weight is still an issue.\par March 9, 2016. The patient is here for followup. Has been under a fair amount of stress as well as increased physical activity around the house with her 's intracerebral hemorrhage. He is improving greatly which has lessened her stress somewhat. She does not complain of chest pain or shortness of breath. She has an upcoming followup with oncologist and that'll be four years so far since her cancer surgery.\par June 16, 2016. Patient is here for followup with a little stress as her  is doing a little better, but she's been having episodes of trouble with her balance. No vertigo. No lightheadedness, syncope, or near syncope. She can't even feel unbalanced while sitting. Usually can last an hour but can function somewhat. Happens about once a week. Does not seem to be related to food or medication.\par October 13, 2016. Patient is here to follow up with her . They celebrated their 65th wedding anniversary. She is doing about the same, with some stress. Had a workup fpr vertebrobasilar insufficiency. She has numbness in both feet, but not in her hands and she will be having an EMG coming up soon.\par January 10, 2017. Patient here for followup. Not feeling well for about a month or so. Has chest pain and back pain when she takes a deep breath, but not with exertion. Needs laser surgery on her eyes complains about her body from feet to her head etc. Never did start the B12 injections that Dr. Rosenstein wanted. She is worried she might need another stent but again no exertional component.\par May 16, 2017. Patient here in followup. No chest pain or shortness of breath. Tripped on the carpet and got an infection in the knee, where she had her knee replacement, but she was able to treated with oral antibiotics and is just about finished. Her weight is up she thinks. She is very concerned about her , who seems to be a little bit more unbalanced and has a little bit more memory difficulties secondary to his intracerebral hemorrhage surgery and obviously getting older as well. No change in any of her medication.\par September 12, 2017. Patient is here in followup. She has been diagnosed with a peripheral neuropathy and is getting B12 injections. Her legs get numb when she flexes them up towards herself. Meanwhile, she is not walking much, so she is gaining a lot of weight. No chest pain, no shortness of breath, but is not as active as she used to be.\par January 9, 2018. The patient is here in followup. She had a six-month breast cancer followup in September, and everything checked out okay. She denies any increase in exertional chest pain or shortness of breath. There is a fair amount of stress from her 's memory and her 22-year-old granddaughter who is living with them. No change in medications.\par May 8, 2018. Patient here in followup. Doing well except gaining weight, not losing. Hed CAT scan showing coronary artery calcifications, and she was very concerned about the report, but I explained it is a normal finding. At this age and does not necessarily correlate with coronary artery disease. The report also mentioned enlarged heart and mitral valve calcification. No recent echo. Here her blood pressure is excellent. EKG unchanged.\par May 14, 2018. Returned for echocardiogram. Moderate MR. Mildly calcified aortic valve. Mild LAE. Normal LV function with concentric remodeling and mild diastolic dysfunction. Mild SYDNEE, with normal RV size and function. Moderate to severe TR with RVSP 44.\par September 13, 2018. Patient here to followup with her . No real complaints. Still, dyspnea on exertion going up stairs, but no chest pressure, and no change. VPCs on EKG, and on exam. Asymptomatic.\par October 29, 2018. On the 19th patient went to GI for colitis type complaints and was found to be in rapid A. fib. Sent to ER at [facility]. Converted with Cardizem and was started on Xarelto and discharged. I spoke with her on the 24th and arranged to see her today. In the meantime over the weekend she was readmitted to [facility] because of palpitations although not clear that she was in atrial fibrillation. She is here today in sinus rhythm with very frequent APCs. Quadrigeminy. For some reason she stopped her other medications, including her beta blocker her diuretic her statin and Plavix. She is also on metronidazole and Cipro because of her colitis.\par November 19, 2018. Patient is here in followup and for a clearance for a sigmoid biopsy and colonoscopy by Dr. Jonny Connor. The patient remains in sinus rhythm on sotalol. She is asymptomatic. She is somewhat nervous about the anesthesia and procedure. It is scheduled for November 29.She had been on 15 mg of Xarelto from the hospital, but based on her kidney function should be on 20 mg. If she stops the anticoagulation she does need to take baby aspirin because of her cardiac stent.\par January 18, 2019. Patient here in followup. Had a chest x-ray around January 4 and was nervous because he reports that prominent pulmonary arteries, possible pulmonary hypertension. Patient is here and remains in sinus rhythm. No signs of pulmonary hypertension on her EKG. (Echo in May, 2018 did have moderate TR with RVSP 44.) A little depressed over her macular degeneration and now she can't drive etc. otherwise no cardiac complaints. Weight is stable.\par July 26, 2019. Patient here in followup. Remains in sinus rhythm. Saw Dr. Rosenstein for workup of her peripheral neuropathy. He told her she was diabetic, although her hemoglobin A1c was 6.4 in June, which is where has been her last 3 visits here. In fact, she stopped eating cake and now has lost 12 pounds. By the same token, she could have a diabetic neuropathy, even before the full blown diagnosis of diabetes. In any case, she seems to be fairly stable, although a lot of stress because of her , as well as her own macular degeneration. She is also now on B12 shots. Her labs were also notable for a weekly migrating paraprotein that could not be characterized and homogenous JESSE at 1:320.\par November 8, 2019.  Patient here in follow-up.  "I am diabetic now".  She is on metformin 1000 and day and tolerating it.  She cut out cake and cookies and has lost 6 pounds since July and 18 pounds since January.  Only stress is regarding her .  Had a lump in her arm that turned out to be a biceps tear and she may be going to physical therapy.  Had a flu shot already.\par March 30, 2020.  Patient awoke with atypical left arm and shoulder pain and maybe some chest pain. Different from her angina and she thinks maybe she just slept wrong. But while thinking about it and being nervous about it she had palpitations that lasted for maybe 15 to 20 minutes. She did not have shortness of breath dizziness or worsening chest pain with that. The palpitations are gone. The patient was given an appointment to come in later today but she is very scared about the coronavirus and anxious about coming in. Currently her arm pain has resolved as well. After long discussion we elected to have her stay home and relax and only come in if the symptoms recur.  \par May 26, 2020.  Patient here in follow-up.  No more of the arm pain mentioned above.  Doing well overall just hard to deal with the stress of COVID-19 and her 's mild but progressing dementia.  Denies chest pain shortness of breath palpitations.  A little orthostatic lightheadedness.  EKG is sinus rhythm at 62 and unremarkable and unchanged.\par September 23, 2020.  Patient here in follow-up and for echocardiogram.  She turned 89.  She did gain 5 pounds.  Doing well but a lot of stress with her  because of his mild dementia.  Her echocardiogram is for the most part unchanged from 2 years ago.  She has normal LV function and she has only mild to moderate MR and moderate aortic stenosis and tricuspid regurgitation.  RVSP is 44 with normal RV function.\par January 26, 2021.  Patient here in follow-up.  Doing well although her  now hears voices.  She started eating some cake from family celebrations and noticed that her sugar the next morning was still normal so she kept on eating cake and has gained 6 pounds.  She also blames it on inactivity from Covid.  No cardiac symptoms.  EKG is sinus bradycardia and unchanged.  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  Patient now here with her  in follow-up.  Her walking is a little harder and Dr. Rosenstein of neurology is sending her for physical therapy.  No chest pain or shortness of breath.  Has lost 2 pounds.  No interval medical issues.  Sinus rhythm with APCs and otherwise unchanged.  Having some constipation, possibly from diltiazem.\par September 1, 2021.  Patient here in follow-up.  Has been going for physical therapy but has definitely been noticing some new symptoms.  She is not a great descriptor or historian but there is clearly shortness of breath with exertion although maybe not chest pain or pressure.  She is not clear if this is similar to a different from when she had her stents.  After long discussion and review of the options and after her exam and ECG we decided to first try a low-level stress echo and if she cannot do enough exercise to make a diagnosis she would then need a pharmacologic nuclear study.\par October 13, 2021. Different sx, now non-exertional SOB associated with trouble swallowing. Scheduled for stress in 2 weeks. She will call GI and I would like her to try PPI for now. If no change will come in. \par October 14, 2021.  JUMA ACOSTA - 14 Oct 2021 2:46 PM \par   TASK CREATED\par Ptaient would like to let you know that she is feeling much better and would see you on 10/27/21 \par October 27, 2021.Patient came for stress echo but it was clear she would not be able to walk on the treadmill so a plain echo was done first.  It demonstrated mild to moderate MR and mild MS.  Minimal AS and no AI.  Severe LAE. Normal LV size and function with LVEF 64%.  Normal RV size and function with mild to moderate TR and RVSP 42. Patient will be scheduled for pharmacologic nuclear study. \par October 28, 2021.  Reviewed echo with patient.  A lot of things going on now including taking her  for an endoscopy.  She claims that those symptoms have resolved and she really is feeling fine.  She promises that if she has any recurrence of the symptoms she will call and schedule be pharmacologic nuclear study but for now she would like to hold off which is reasonable as long as she remains honest with herself and her symptoms.\par November 17, 2021.Had pharmacologic nuclear study on the 16th.  There was some artifact but otherwise normal perfusion scan with no evidence of infarction or ischemia and LVEF greater than 70% with no wall motion abnormalities.  Left message for patient \par July 5, 2022.  First visit since last year.  Main issue has been constipation and she is back-and-forth with her GI doctors trying to come up with a good regimen.  Otherwise no cardiac complaints, no chest pain or shortness of breath, no COVID issues.  Only issue is her 's slow deterioration and dementia.\par September 20, 2022. Patient called.  A lot of muscle aches shoulders upper arms and her internist said it could be from the simvastatin.  I discussed with the patient recent studies that really showed with no different than placebo and that especially if you have been on it for a long time it is extremely unlikely that these new symptoms are from the simvastatin.  I did mention I would be willing to give a trial off of it or switching to a different statin but after that discussion she did not want to change.  She will try to tough it out and see what happens.  In addition she mentioned that she was hospitalized at [place] at the beginning of August and I was never notified and there was a question of an aneurysm and an angiogram was done and it was not an aneurysm.  I will try to obtain the records from the hospital.\par Checked [facility] computer system and no admission for the patient in there \par December 8, 2022.  Patient returns in follow-up with her .  She says "I am still here" now 89 years old.  However complaining about severe neuropathy and arthritis pains without much relief and is considering medical marijuana.  She will be seeing a pain specialist about it.  No angina palpitations shortness of breath etc.  Still stress helping with her  but he seems to be relatively stable.  Her EKG is sinus rhythm with APCs and some J-point elevation V1 to 3, overall no change.  Blood pressure is excellent.  Weight is down maybe 10 pounds.\par June 26, 2023.  Patient here in follow-up together with her .  She is now using a rolling walker.  She saw her primary care Dr. Tadeo Do on June 1 and her blood pressure was 100/50.  He asked for certain labs to be drawn.  I reviewed all her medication.  She saw her oncologist Dr. Olmos on April 6.  He wants her to continue the Arimidex 1 mg daily and to have a mammogram and sonogram in August. She had a fall and head trauma in May, evaluated at [facility], small bleed on first CT, kept overnight  and had 3 CTs  and all okay. In sinus today and some TAM with stairs, no CP.

## 2023-06-27 LAB
ALBUMIN SERPL ELPH-MCNC: 4.1 G/DL
ALP BLD-CCNC: 76 U/L
ALT SERPL-CCNC: 10 U/L
ANION GAP SERPL CALC-SCNC: 12 MMOL/L
APPEARANCE: CLEAR
AST SERPL-CCNC: 16 U/L
BACTERIA: NEGATIVE /HPF
BILIRUB SERPL-MCNC: 0.4 MG/DL
BILIRUBIN URINE: NEGATIVE
BLOOD URINE: NEGATIVE
BUN SERPL-MCNC: 24 MG/DL
CALCIUM SERPL-MCNC: 9.5 MG/DL
CAST: 0 /LPF
CHLORIDE SERPL-SCNC: 98 MMOL/L
CHOLEST SERPL-MCNC: 126 MG/DL
CK SERPL-CCNC: 33 U/L
CO2 SERPL-SCNC: 23 MMOL/L
COLOR: YELLOW
CREAT SERPL-MCNC: 0.79 MG/DL
DEPRECATED KAPPA LC FREE/LAMBDA SER: 1.91 RATIO
EGFR: 70 ML/MIN/1.73M2
EPITHELIAL CELLS: 1 /HPF
ESTIMATED AVERAGE GLUCOSE: 137 MG/DL
GLUCOSE QUALITATIVE U: NEGATIVE MG/DL
HBA1C MFR BLD HPLC: 6.4 %
HDLC SERPL-MCNC: 46 MG/DL
KAPPA LC CSF-MCNC: 1.5 MG/DL
KAPPA LC SERPL-MCNC: 2.87 MG/DL
KETONES URINE: NEGATIVE MG/DL
LDLC SERPL CALC-MCNC: 63 MG/DL
LEUKOCYTE ESTERASE URINE: ABNORMAL
MICROSCOPIC-UA: NORMAL
NITRITE URINE: NEGATIVE
NONHDLC SERPL-MCNC: 80 MG/DL
NT-PROBNP SERPL-MCNC: 2457 PG/ML
PH URINE: 6
POTASSIUM SERPL-SCNC: 4.6 MMOL/L
PROT SERPL-MCNC: 6.3 G/DL
PROTEIN URINE: NEGATIVE MG/DL
RED BLOOD CELLS URINE: 7 /HPF
REVIEW: NORMAL
SODIUM SERPL-SCNC: 133 MMOL/L
SPECIFIC GRAVITY URINE: 1.02
TRIGL SERPL-MCNC: 85 MG/DL
TSH SERPL-ACNC: 2.94 UIU/ML
UROBILINOGEN URINE: 0.2 MG/DL
VIT B12 SERPL-MCNC: 1530 PG/ML
WHITE BLOOD CELLS URINE: 2 /HPF

## 2023-07-02 ENCOUNTER — RX RENEWAL (OUTPATIENT)
Age: 88
End: 2023-07-02

## 2023-07-11 ENCOUNTER — RX RENEWAL (OUTPATIENT)
Age: 88
End: 2023-07-11

## 2023-08-13 ENCOUNTER — RX RENEWAL (OUTPATIENT)
Age: 88
End: 2023-08-13

## 2023-08-14 ENCOUNTER — RX RENEWAL (OUTPATIENT)
Age: 88
End: 2023-08-14

## 2023-10-02 PROBLEM — Z85.3 HISTORY OF BREAST CANCER: Status: ACTIVE | Noted: 2018-11-05

## 2023-10-09 ENCOUNTER — APPOINTMENT (OUTPATIENT)
Dept: SURGICAL ONCOLOGY | Facility: CLINIC | Age: 88
End: 2023-10-09
Payer: MEDICARE

## 2023-10-09 VITALS
SYSTOLIC BLOOD PRESSURE: 100 MMHG | BODY MASS INDEX: 31.41 KG/M2 | DIASTOLIC BLOOD PRESSURE: 60 MMHG | WEIGHT: 160 LBS | OXYGEN SATURATION: 99 % | HEART RATE: 60 BPM | HEIGHT: 60 IN

## 2023-10-09 DIAGNOSIS — Z85.3 PERSONAL HISTORY OF MALIGNANT NEOPLASM OF BREAST: ICD-10-CM

## 2023-10-09 DIAGNOSIS — C50.919 MALIGNANT NEOPLASM OF UNSPECIFIED SITE OF UNSPECIFIED FEMALE BREAST: ICD-10-CM

## 2023-10-09 PROCEDURE — 99214 OFFICE O/P EST MOD 30 MIN: CPT

## 2023-10-15 ENCOUNTER — RX RENEWAL (OUTPATIENT)
Age: 88
End: 2023-10-15

## 2023-10-30 ENCOUNTER — NON-APPOINTMENT (OUTPATIENT)
Age: 88
End: 2023-10-30

## 2023-10-30 ENCOUNTER — APPOINTMENT (OUTPATIENT)
Dept: CARDIOLOGY | Facility: CLINIC | Age: 88
End: 2023-10-30
Payer: MEDICARE

## 2023-10-30 VITALS
SYSTOLIC BLOOD PRESSURE: 126 MMHG | BODY MASS INDEX: 31.25 KG/M2 | OXYGEN SATURATION: 97 % | DIASTOLIC BLOOD PRESSURE: 75 MMHG | WEIGHT: 160 LBS | HEART RATE: 55 BPM

## 2023-10-30 PROCEDURE — 99214 OFFICE O/P EST MOD 30 MIN: CPT | Mod: 25

## 2023-10-30 PROCEDURE — 93000 ELECTROCARDIOGRAM COMPLETE: CPT

## 2023-10-30 PROCEDURE — 93306 TTE W/DOPPLER COMPLETE: CPT

## 2023-10-31 LAB
ALBUMIN SERPL ELPH-MCNC: 4.2 G/DL
ALP BLD-CCNC: 82 U/L
ALT SERPL-CCNC: 11 U/L
ANION GAP SERPL CALC-SCNC: 11 MMOL/L
AST SERPL-CCNC: 18 U/L
BILIRUB SERPL-MCNC: 0.4 MG/DL
BUN SERPL-MCNC: 20 MG/DL
CALCIUM SERPL-MCNC: 9.5 MG/DL
CHLORIDE SERPL-SCNC: 98 MMOL/L
CHOLEST SERPL-MCNC: 121 MG/DL
CO2 SERPL-SCNC: 24 MMOL/L
CREAT SERPL-MCNC: 0.72 MG/DL
EGFR: 78 ML/MIN/1.73M2
GLUCOSE SERPL-MCNC: 101 MG/DL
HCT VFR BLD CALC: 34.1 %
HDLC SERPL-MCNC: 53 MG/DL
HGB BLD-MCNC: 10.6 G/DL
LDLC SERPL CALC-MCNC: 57 MG/DL
MCHC RBC-ENTMCNC: 27.6 PG
MCHC RBC-ENTMCNC: 31.1 GM/DL
MCV RBC AUTO: 88.8 FL
NONHDLC SERPL-MCNC: 69 MG/DL
NT-PROBNP SERPL-MCNC: 691 PG/ML
PLATELET # BLD AUTO: 210 K/UL
POTASSIUM SERPL-SCNC: 4.6 MMOL/L
PROT SERPL-MCNC: 6.4 G/DL
RBC # BLD: 3.84 M/UL
RBC # FLD: 15.2 %
SODIUM SERPL-SCNC: 134 MMOL/L
TRIGL SERPL-MCNC: 53 MG/DL
WBC # FLD AUTO: 5.04 K/UL

## 2023-11-02 LAB
ESTIMATED AVERAGE GLUCOSE: 123 MG/DL
HBA1C MFR BLD HPLC: 5.9 %

## 2023-11-27 ENCOUNTER — RX RENEWAL (OUTPATIENT)
Age: 88
End: 2023-11-27

## 2024-01-07 ENCOUNTER — RX RENEWAL (OUTPATIENT)
Age: 89
End: 2024-01-07

## 2024-02-26 ENCOUNTER — RX RENEWAL (OUTPATIENT)
Age: 89
End: 2024-02-26

## 2024-02-29 ENCOUNTER — NON-APPOINTMENT (OUTPATIENT)
Age: 89
End: 2024-02-29

## 2024-02-29 ENCOUNTER — APPOINTMENT (OUTPATIENT)
Dept: CARDIOLOGY | Facility: CLINIC | Age: 89
End: 2024-02-29
Payer: MEDICARE

## 2024-02-29 VITALS
WEIGHT: 160 LBS | HEART RATE: 56 BPM | DIASTOLIC BLOOD PRESSURE: 60 MMHG | BODY MASS INDEX: 31.41 KG/M2 | OXYGEN SATURATION: 98 % | HEIGHT: 60 IN | SYSTOLIC BLOOD PRESSURE: 100 MMHG

## 2024-02-29 DIAGNOSIS — Z86.79 PERSONAL HISTORY OF OTHER DISEASES OF THE CIRCULATORY SYSTEM: ICD-10-CM

## 2024-02-29 PROCEDURE — 93000 ELECTROCARDIOGRAM COMPLETE: CPT

## 2024-02-29 PROCEDURE — G2211 COMPLEX E/M VISIT ADD ON: CPT

## 2024-02-29 PROCEDURE — 99214 OFFICE O/P EST MOD 30 MIN: CPT

## 2024-02-29 NOTE — DISCUSSION/SUMMARY
[FreeTextEntry1] : Check labs including proBNP.  If all okay follow-up 4 months.  Not sure yet when to do next echo [EKG obtained to assist in diagnosis and management of assessed problem(s)] : EKG obtained to assist in diagnosis and management of assessed problem(s)

## 2024-02-29 NOTE — REASON FOR VISIT
[FreeTextEntry1] : Followup of patient with cardiac stents and risk factors for CAD, now with paroxysmal atrial fib and DM

## 2024-02-29 NOTE — ASSESSMENT
[FreeTextEntry1] : New issue was paroxysmal atrial fibrillation. On Cardizem  and Xarelto 15. I changed her bisoprolol to sotalol 80 mg q.12 h.; normal LV function on echo just in May, 2019. Not fatigued from the beta blocker part. She can stop her Plavix while she is on Xarelto. No complaints of fatigue. Worked up for neuropathy but finally is okay about being told that she is diabetic, although technically just borderline. Doing some physical therapy. Symptoms progressing and no relief so she is considering medical marijuana. She will be seeing a pain specialist soon. Blood pressure acceptable, holding sinus rhythm. Her exam is unremarkable (sounds like AI but none on echo 9/2020) and EKG unchanged. The patient is off Diovan, still on Aldactazide only every other day with no edema and no orthostatic symptoms. There is no CHF on exam. She remains in sinus rhythm at 60 with an unchanged EKG and frequent APCs on exam as well. She has had no more angina but now is having shortness of breath some with exertion and difficult to pin her down but could be an anginal equivalent or progression of her valvular disease. Echo 9/2020 was for the most part unchanged from 2018. Long discussion of how to work-up her coronary arteries noninvasively. She is not sure how much she can walk so my initial plan was a nuclear study with a change to pharmacologic if she does not achieve adequate exercise but she was a little wary of the nuclear study and agrees to try a low-level stress echocardiogram first. Tried stress echo October 27 but she really could not walk well. The echo showed only minimal AS, no AI, mild to moderate MR, normal LV and RV size and function. LVEF 64%. She returned for a pharmacologic nuclear study on November 16 and that showed normal perfusion with no evidence of infarction or ischemia and LVEF post exercise greater than 70%. She is stable from a cardiac point of view and remains in sinus rhythm with atrial trigeminy and the rest of her ECG unchanged.. Labs sent. If her renal function is still normal we will continue her Xarelto at 20 mg. Patient returned June 26, 2023. Still a lot of stress having to keep an eye on and care for her  and his dementia. New interval issue was that she had a fall and did have head trauma with a small cerebral bleed but that seems to have resolved. If falls continue to be a problem then the risk-benefit ratio may be against anticoagulation especially if she can maintain sinus rhythm on the sotalol which she has up till now. She does have issues with dyspnea on exertion which may just be aging stress related or could be progression of her valve disease. Her MR murmur does sound louder today at the apex. She continues to lose weight which may be a positive but we are not sure. If her diabetes progresses Jardiance may be a good medicine for her. I believe she saw Dr. Avram Goldberg of rheumatology or will be seeing him soon Patient returned in follow-up October 30th 2023. No new issues or symptoms, still almost overwhelmed caring for her  but managing well with no new cardiac symptoms. Her echo has shown some worsening of her calcified mitral valve with more TR, more SYDNEE, higher PA pressure but clinically no change. We discussed watching for further shortness of breath with decrease in activity ability. She is still holding sinus rhythm and her EKG is otherwise unchanged. Labs were sent.   Patient returns today February 29, 2024 again together with her  and again that all she seems to worry about.  Denies any cardiac symptoms especially change in shortness of breath etc.  Exam seems to be the same with significant MR murmur, no signs of fluid overload, just fat ankles may be trace edema.  EKG stays in sinus and unchanged as well.  Blood pressure excellent.  Labs were sent and if no changes are made she can follow-up in 4 months again

## 2024-02-29 NOTE — CARDIOLOGY SUMMARY
[No Ischemia] : no Ischemia [___] : [unfilled] [LVEF ___%] : LVEF [unfilled]% [None] : normal LV function [Enlarged] : enlarged LA size [Mild] : mild mitral regurgitation [___] : [unfilled] [de-identified] : October 30, 2023.  Heavily calcified mitral valve annulus with moderate leaflet calcification.  Mild to moderate MS with peak gradient 11.6, mean 4.12, MVA by pressure half-time is 2.1 cm.  Mild to moderate MR.  Trileaflet aortic valve with mildly decreased opening.  Mild AAS and trace AI.  Mild LAE.  Normal LV size and systolic function with LVEF 66%.  Concentric remodeling.  Severely dilated RA.  Mild RVE with normal systolic function.  Moderate TR with PAS at least 53 mmHg.  No pericardial effusion.

## 2024-02-29 NOTE — PHYSICAL EXAM
[General Appearance - Well Developed] : well developed [Normal Appearance] : normal appearance [Well Groomed] : well groomed [General Appearance - Well Nourished] : well nourished [No Deformities] : no deformities [General Appearance - In No Acute Distress] : no acute distress [Normal Conjunctiva] : the conjunctiva exhibited no abnormalities [Eyelids - No Xanthelasma] : the eyelids demonstrated no xanthelasmas [Normal Oral Mucosa] : normal oral mucosa [No Oral Pallor] : no oral pallor [No Oral Cyanosis] : no oral cyanosis [Normal Jugular Venous V Waves Present] : normal jugular venous V waves present [Normal Jugular Venous A Waves Present] : normal jugular venous A waves present [No Jugular Venous Epps A Waves] : no jugular venous epps A waves [Exaggerated Use Of Accessory Muscles For Inspiration] : no accessory muscle use [Respiration, Rhythm And Depth] : normal respiratory rhythm and effort [Auscultation Breath Sounds / Voice Sounds] : lungs were clear to auscultation bilaterally [Heart Rate And Rhythm] : heart rate and rhythm were normal [Heart Sounds] : normal S1 and S2 [Murmurs] : no murmurs present [Abdomen Tenderness] : non-tender [Abdomen Soft] : soft [Abdomen Mass (___ Cm)] : no abdominal mass palpated [Abnormal Walk] : normal gait [Gait - Sufficient For Exercise Testing] : the gait was sufficient for exercise testing [Nail Clubbing] : no clubbing of the fingernails [Petechial Hemorrhages (___cm)] : no petechial hemorrhages [Cyanosis, Localized] : no localized cyanosis [Skin Color & Pigmentation] : normal skin color and pigmentation [] : no rash [Skin Lesions] : no skin lesions [No Skin Ulcers] : no skin ulcer [No Venous Stasis] : no venous stasis [No Xanthoma] : no  xanthoma was observed [Affect] : the affect was normal [Oriented To Time, Place, And Person] : oriented to person, place, and time [No Anxiety] : not feeling anxious [Mood] : the mood was normal [FreeTextEntry1] : 1+ ankle edema (just "fat" ankles). Pedal pulses intact. Pulses symmetric, and no pulse deficiits

## 2024-02-29 NOTE — HISTORY OF PRESENT ILLNESS
[FreeTextEntry1] : (MED HX) Patient with known coronary disease with stents to her circumflex artery and obtuse marginal going back to 2006 and 2008. Normal stress thallium in 2011 before surgery. Symptoms of esophageal reflux on and off that have been different than her angina. However in August, 2012 she had a return of her usual angina which initially responded to bisoprolol but then her symptoms increased and the patient called me on September 7. She was scheduled for a cardiac catheterization on September 10 and found to have a subtotal occlusion of her circumflex. This was treated with a drug-eluting stent and she was sent home on the 11th on aspirin and Plavix and she came  for followup 9/28/12. Since being home she has not had any of those same symptoms and is able to walk up the stairs now without shortness of breath or chest pain. She was here in January, 2013 for clearance for cataract surgery. She had no complications and was doing well since then other than having trouble maintaining her weight as she goes to [employer] and her  is able to eat cake and other foods without gaining any weight. She denies chest pain or shortness of breath with exertion, although when she lies down in bed she says she feels short of breath briefly. August 19, 2013. Here for followup and has no real complaints. She did have some pains in the legs that have been getting better with physical therapy. She denies exertional chest pain or shortness of breath. She is still having trouble losing weight. All seemed acceptable. She had labs with Dr. Do in October. BUN was slightly elevated and the sodium was 131 so he was asking me about cutting back on the diuretic. In addition she was anemic with an MCV of 80. January 15, 2014. The patient is here for followup. Her main issue is that she may need carpal tunnel surgery on both sides. She has some atypical pain in the upper left chest and neck area that is nonexertional and sounds more arthritic or musculoskeletal. She denies chest pain or shortness of breath. She was pleased to find out that based on the scale here she has lost 9 pounds. Tatiana 10, 2014. The patient is here for followup. She didn't need the carpal tunnel surgery but instead responded well to a steroid injection. However she developed bursitis in her right shoulder and had a steroid shot that just lasted for about a month. Now she is finishing up with physical therapy. Her main complaint is that she just cannot walk like she used to; she just kind of hits a wall and has to slow down. She denies exertional chest pain or shortness of breath and no definite leg pain but thinks it could be her arthritis. She claims her weight is about the same. She has had no change in her medications recently. The patient saw Dr. Geronimo in September for preop evaluation and she underwent carpal tunnel surgery on October 2. No complications and she saw Dr. Jaylan Christianson in followup. November 14, 2014. The patient is here in followup. When she lies down at night she gets pressure in her chest which feels similar to her previous symptoms and she was worried she might need another stent however she tries to see what happens when she walks up and down the stairs and has absolutely no symptoms whatsoever. Upon further questioning it seems that she was told to go back on the aspirin in addition to the Plavix by the presurgical testing people before the carpal tunnel surgery and it is only since being back on the aspirin that she has these symptoms. There is no shortness of breath. She is having trouble losing weight. I felt fairly certain that her symptoms were reflux and related to the aspirin. The aspirin was stopped and the AcipHex was increased to b.i.d. March 23, 2015. Patient is here for followup. On the whole she has been doing well since her last visit without recurrence of her chest pain or shortness of breath. However about 2-3 months ago she had a syncopal episode when she got up from the table and before she realized it she was on the floor. When she came to she was a little shaky but otherwise was okay. No recurrence. The next day she was in pain in her joints and was worried about her knee so  she was checked out by orthopedics and everything was fine. She has had no orthostatic symptoms since then and no lightheadedness or near syncope since then. She is on a diuretic every other day and she does not recall being ill around that time or any reason why she might have been dehydrated. She is on 5 mg of bisoprolol but has had no other episodes of syncope or near syncope in the last 2 months. Workup was unremarkable July 29, 2015. The patient is here in followup. She is complaining that her legs and arms feel heavy as if she's walking in sand or water. She is borderline hypotensive and it could just be because of the heat and volume depletion but the patient has not lost any weight and she claims that the symptoms have gone on for a few months not just recently when it's been hot. She denies chest pain or exertional shortness of breath. She has been having some trouble sleeping as well. Her EKG is totally within normal limits. I elected to hold her valsartan and then check labs for kidney function looking for volume depletion as to whether we should cut back on the diuretic more than just every other day. The possibility includes whether these are side effects of statin. No recurrence of her syncope.  November 12, 2015. The patient is here for followup. She is now off Diovan because of her orthostatic symptoms and these seemed to have improved. She has no specific complaints. She is asking about aspirin versus Plavix, and she has remained just on Plavix most of this time. She does have esophageal reflux and is under treatment and under control. She saw Dr. Olmos in followup for her breast cancer and everything is fine and she is to see him again in 6 months. She denies exertional chest pain or shortness of breath. Her weight is still an issue. March 9, 2016. The patient is here for followup. Has been under a fair amount of stress as well as increased physical activity around the house with her 's intracerebral hemorrhage. He is improving greatly which has lessened her stress somewhat. She does not complain of chest pain or shortness of breath. She has an upcoming followup with oncologist and that'll be four years so far since her cancer surgery. June 16, 2016. Patient is here for followup with a little stress as her  is doing a little better, but she's been having episodes of trouble with her balance. No vertigo. No lightheadedness, syncope, or near syncope. She can't even feel unbalanced while sitting. Usually can last an hour but can function somewhat. Happens about once a week. Does not seem to be related to food or medication. October 13, 2016. Patient is here to follow up with her . They celebrated their 65th wedding anniversary. She is doing about the same, with some stress. Had a workup fpr vertebrobasilar insufficiency. She has numbness in both feet, but not in her hands and she will be having an EMG coming up soon. January 10, 2017. Patient here for followup. Not feeling well for about a month or so. Has chest pain and back pain when she takes a deep breath, but not with exertion. Needs laser surgery on her eyes complains about her body from feet to her head etc. Never did start the B12 injections that Dr. Rosenstein wanted. She is worried she might need another stent but again no exertional component. May 16, 2017. Patient here in followup. No chest pain or shortness of breath. Tripped on the carpet and got an infection in the knee, where she had her knee replacement, but she was able to treated with oral antibiotics and is just about finished. Her weight is up she thinks. She is very concerned about her , who seems to be a little bit more unbalanced and has a little bit more memory difficulties secondary to his intracerebral hemorrhage surgery and obviously getting older as well. No change in any of her medication. September 12, 2017. Patient is here in followup. She has been diagnosed with a peripheral neuropathy and is getting B12 injections. Her legs get numb when she flexes them up towards herself. Meanwhile, she is not walking much, so she is gaining a lot of weight. No chest pain, no shortness of breath, but is not as active as she used to be. January 9, 2018. The patient is here in followup. She had a six-month breast cancer followup in September, and everything checked out okay. She denies any increase in exertional chest pain or shortness of breath. There is a fair amount of stress from her 's memory and her 22-year-old granddaughter who is living with them. No change in medications. May 8, 2018. Patient here in followup. Doing well except gaining weight, not losing. Hed CAT scan showing coronary artery calcifications, and she was very concerned about the report, but I explained it is a normal finding. At this age and does not necessarily correlate with coronary artery disease. The report also mentioned enlarged heart and mitral valve calcification. No recent echo. Here her blood pressure is excellent. EKG unchanged. May 14, 2018. Returned for echocardiogram. Moderate MR. Mildly calcified aortic valve. Mild LAE. Normal LV function with concentric remodeling and mild diastolic dysfunction. Mild SYDNEE, with normal RV size and function. Moderate to severe TR with RVSP 44. September 13, 2018. Patient here to followup with her . No real complaints. Still, dyspnea on exertion going up stairs, but no chest pressure, and no change. VPCs on EKG, and on exam. Asymptomatic. October 29, 2018. On the 19th patient went to GI for colitis type complaints and was found to be in rapid A. fib. Sent to ER at [facility]. Converted with Cardizem and was started on Xarelto and discharged. I spoke with her on the 24th and arranged to see her today. In the meantime over the weekend she was readmitted to [facility] because of palpitations although not clear that she was in atrial fibrillation. She is here today in sinus rhythm with very frequent APCs. Quadrigeminy. For some reason she stopped her other medications, including her beta blocker her diuretic her statin and Plavix. She is also on metronidazole and Cipro because of her colitis. November 19, 2018. Patient is here in followup and for a clearance for a sigmoid biopsy and colonoscopy by Dr. Jonny Connor. The patient remains in sinus rhythm on sotalol. She is asymptomatic. She is somewhat nervous about the anesthesia and procedure. It is scheduled for November 29.She had been on 15 mg of Xarelto from the hospital, but based on her kidney function should be on 20 mg. If she stops the anticoagulation she does need to take baby aspirin because of her cardiac stent. January 18, 2019. Patient here in followup. Had a chest x-ray around January 4 and was nervous because he reports that prominent pulmonary arteries, possible pulmonary hypertension. Patient is here and remains in sinus rhythm. No signs of pulmonary hypertension on her EKG. (Echo in May, 2018 did have moderate TR with RVSP 44.) A little depressed over her macular degeneration and now she can't drive etc. otherwise no cardiac complaints. Weight is stable. July 26, 2019. Patient here in followup. Remains in sinus rhythm. Saw Dr. Rosenstein for workup of her peripheral neuropathy. He told her she was diabetic, although her hemoglobin A1c was 6.4 in June, which is where has been her last 3 visits here. In fact, she stopped eating cake and now has lost 12 pounds. By the same token, she could have a diabetic neuropathy, even before the full blown diagnosis of diabetes. In any case, she seems to be fairly stable, although a lot of stress because of her , as well as her own macular degeneration. She is also now on B12 shots. Her labs were also notable for a weekly migrating paraprotein that could not be characterized and homogenous JESSE at 1:320. November 8, 2019.  Patient here in follow-up.  "I am diabetic now".  She is on metformin 1000 and day and tolerating it.  She cut out cake and cookies and has lost 6 pounds since July and 18 pounds since January.  Only stress is regarding her .  Had a lump in her arm that turned out to be a biceps tear and she may be going to physical therapy.  Had a flu shot already. March 30, 2020.  Patient awoke with atypical left arm and shoulder pain and maybe some chest pain. Different from her angina and she thinks maybe she just slept wrong. But while thinking about it and being nervous about it she had palpitations that lasted for maybe 15 to 20 minutes. She did not have shortness of breath dizziness or worsening chest pain with that. The palpitations are gone. The patient was given an appointment to come in later today but she is very scared about the coronavirus and anxious about coming in. Currently her arm pain has resolved as well. After long discussion we elected to have her stay home and relax and only come in if the symptoms recur.   May 26, 2020.  Patient here in follow-up.  No more of the arm pain mentioned above.  Doing well overall just hard to deal with the stress of COVID-19 and her 's mild but progressing dementia.  Denies chest pain shortness of breath palpitations.  A little orthostatic lightheadedness.  EKG is sinus rhythm at 62 and unremarkable and unchanged. September 23, 2020.  Patient here in follow-up and for echocardiogram.  She turned 89.  She did gain 5 pounds.  Doing well but a lot of stress with her  because of his mild dementia.  Her echocardiogram is for the most part unchanged from 2 years ago.  She has normal LV function and she has only mild to moderate MR and moderate aortic stenosis and tricuspid regurgitation.  RVSP is 44 with normal RV function. January 26, 2021.  Patient here in follow-up.  Doing well although her  now hears voices.  She started eating some cake from family celebrations and noticed that her sugar the next morning was still normal so she kept on eating cake and has gained 6 pounds.  She also blames it on inactivity from Covid.  No cardiac symptoms.  EKG is sinus bradycardia and unchanged.  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 Lipids were still okay and hemoglobin A1c was only 6.3. May 25, 2021.  Patient now here with her  in follow-up.  Her walking is a little harder and Dr. Rosenstein of neurology is sending her for physical therapy.  No chest pain or shortness of breath.  Has lost 2 pounds.  No interval medical issues.  Sinus rhythm with APCs and otherwise unchanged.  Having some constipation, possibly from diltiazem. September 1, 2021.  Patient here in follow-up.  Has been going for physical therapy but has definitely been noticing some new symptoms.  She is not a great descriptor or historian but there is clearly shortness of breath with exertion although maybe not chest pain or pressure.  She is not clear if this is similar to a different from when she had her stents.  After long discussion and review of the options and after her exam and ECG we decided to first try a low-level stress echo and if she cannot do enough exercise to make a diagnosis she would then need a pharmacologic nuclear study. October 13, 2021. Different sx, now non-exertional SOB associated with trouble swallowing. Scheduled for stress in 2 weeks. She will call GI and I would like her to try PPI for now. If no change will come in.  October 14, 2021.  JUMA ACOSTA - 14 Oct 2021 2:46 PM    TASK CREATED Ptaient would like to let you know that she is feeling much better and would see you on 10/27/21  October 27, 2021.Patient came for stress echo but it was clear she would not be able to walk on the treadmill so a plain echo was done first.  It demonstrated mild to moderate MR and mild MS.  Minimal AS and no AI.  Severe LAE. Normal LV size and function with LVEF 64%.  Normal RV size and function with mild to moderate TR and RVSP 42. Patient will be scheduled for pharmacologic nuclear study.  October 28, 2021.  Reviewed echo with patient.  A lot of things going on now including taking her  for an endoscopy.  She claims that those symptoms have resolved and she really is feeling fine.  She promises that if she has any recurrence of the symptoms she will call and schedule be pharmacologic nuclear study but for now she would like to hold off which is reasonable as long as she remains honest with herself and her symptoms. November 17, 2021.Had pharmacologic nuclear study on the 16th.  There was some artifact but otherwise normal perfusion scan with no evidence of infarction or ischemia and LVEF greater than 70% with no wall motion abnormalities.  Left message for patient  July 5, 2022.  First visit since last year.  Main issue has been constipation and she is back-and-forth with her GI doctors trying to come up with a good regimen.  Otherwise no cardiac complaints, no chest pain or shortness of breath, no COVID issues.  Only issue is her 's slow deterioration and dementia. September 20, 2022. Patient called.  A lot of muscle aches shoulders upper arms and her internist said it could be from the simvastatin.  I discussed with the patient recent studies that really showed with no different than placebo and that especially if you have been on it for a long time it is extremely unlikely that these new symptoms are from the simvastatin.  I did mention I would be willing to give a trial off of it or switching to a different statin but after that discussion she did not want to change.  She will try to tough it out and see what happens.  In addition she mentioned that she was hospitalized at [facility] at the beginning of August and I was never notified and there was a question of an aneurysm and an angiogram was done and it was not an aneurysm.  I will try to obtain the records from the hospital. Checked [facility] computer system and no admission for the patient in there  December 8, 2022.  Patient returns in follow-up with her .  She says "I am still here" now 89 years old.  However complaining about severe neuropathy and arthritis pains without much relief and is considering medical marijuana.  She will be seeing a pain specialist about it.  No angina palpitations shortness of breath etc.  Still stress helping with her  but he seems to be relatively stable.  Her EKG is sinus rhythm with APCs and some J-point elevation V1 to 3, overall no change.  Blood pressure is excellent.  Weight is down maybe 10 pounds. June 26, 2023.  Patient here in follow-up together with her .  She is now using a rolling walker.  She saw her primary care Dr. Tadeo Do on June 1 and her blood pressure was 100/50.  He asked for certain labs to be drawn.  I reviewed all her medication.  She saw her oncologist Dr. Olmos on April 6.  He wants her to continue the Arimidex 1 mg daily and to have a mammogram and sonogram in August. She had a fall and head trauma in May, evaluated at [facility], small bleed on first CT, kept overnight  and had 3 CTs  and all okay. In sinus today and some TAM with stairs, no CP.  October 30, 2023.  Patient returns in follow-up and for echocardiogram.  She herself has no complaints but of course she says it is because she is too busy taking care of and worrying about her  with his dementia.  She had a home draw for CEA and CA 27 for Dr. Henry last month and both were normal.  On her echo today there is some progression in the mitral stenosis and worsening of her TR with enlarged RA and increased PAS to at least 53.  However the LV function is still normal with LVEF of 66.  Mitral annulus severely calcified .  Mild to moderate MR, only trace AI, at least moderate TR.  She has not noticed any decrease in her exercise capacity, difficulty in her usual daily tasks of living etc.  Also no palpitations etc. February 29, 2024.  Patient returns in follow-up along with her .  She remains in sinus rhythm at 55 and the rest of her ECG is unremarkable.  Her blood pressure is on the low side but acceptable and her weight is constant. No chest pain or TAM. No palpitations. No medical or cardiac issues. No change meds. (Ever since I told her to watch for shortness of breath because of her MR she keeps catching herself and thinking "was that shortness of breath?")

## 2024-02-29 NOTE — REVIEW OF SYSTEMS
[Hearing Loss] : hearing loss [Dyspnea on exertion] : dyspnea during exertion [Joint Pain] : joint pain [Lower Ext Edema] : lower extremity edema [Under Stress] : under stress [Negative] : Psychiatric [Weight Gain (___ Lbs)] : no recent weight gain [Weight Loss (___ Lbs)] : no recent weight loss [Chest Discomfort] : no chest discomfort [FreeTextEntry3] : Macular degeneration, almost legally blind. [FreeTextEntry5] : No change in chronic TAM [de-identified] : Stress of her 's dementia

## 2024-03-01 ENCOUNTER — RX RENEWAL (OUTPATIENT)
Age: 89
End: 2024-03-01

## 2024-03-01 LAB
ALBUMIN SERPL ELPH-MCNC: 4.4 G/DL
ALP BLD-CCNC: 74 U/L
ALT SERPL-CCNC: 7 U/L
ANION GAP SERPL CALC-SCNC: 12 MMOL/L
AST SERPL-CCNC: 16 U/L
BILIRUB SERPL-MCNC: 0.3 MG/DL
BUN SERPL-MCNC: 24 MG/DL
CALCIUM SERPL-MCNC: 9.8 MG/DL
CHLORIDE SERPL-SCNC: 99 MMOL/L
CHOLEST SERPL-MCNC: 128 MG/DL
CO2 SERPL-SCNC: 24 MMOL/L
CREAT SERPL-MCNC: 0.74 MG/DL
EGFR: 75 ML/MIN/1.73M2
ESTIMATED AVERAGE GLUCOSE: 120 MG/DL
GLUCOSE SERPL-MCNC: 90 MG/DL
HBA1C MFR BLD HPLC: 5.8 %
HCT VFR BLD CALC: 34.4 %
HDLC SERPL-MCNC: 55 MG/DL
HGB BLD-MCNC: 10.9 G/DL
LDLC SERPL CALC-MCNC: 60 MG/DL
MCHC RBC-ENTMCNC: 27.2 PG
MCHC RBC-ENTMCNC: 31.7 GM/DL
MCV RBC AUTO: 85.8 FL
NONHDLC SERPL-MCNC: 73 MG/DL
NT-PROBNP SERPL-MCNC: 604 PG/ML
PLATELET # BLD AUTO: 226 K/UL
POTASSIUM SERPL-SCNC: 4.8 MMOL/L
PROT SERPL-MCNC: 6.7 G/DL
RBC # BLD: 4.01 M/UL
RBC # FLD: 15.5 %
SODIUM SERPL-SCNC: 136 MMOL/L
TRIGL SERPL-MCNC: 57 MG/DL
TSH SERPL-ACNC: 2.43 UIU/ML
WBC # FLD AUTO: 5.78 K/UL

## 2024-03-01 RX ORDER — DILTIAZEM HYDROCHLORIDE 120 MG/1
120 CAPSULE, EXTENDED RELEASE ORAL
Qty: 90 | Refills: 1 | Status: ACTIVE | COMMUNITY
Start: 2018-12-07 | End: 1900-01-01

## 2024-03-17 ENCOUNTER — RX RENEWAL (OUTPATIENT)
Age: 89
End: 2024-03-17

## 2024-03-25 ENCOUNTER — NON-APPOINTMENT (OUTPATIENT)
Age: 89
End: 2024-03-25

## 2024-04-01 ENCOUNTER — RX RENEWAL (OUTPATIENT)
Age: 89
End: 2024-04-01

## 2024-04-07 ENCOUNTER — RX RENEWAL (OUTPATIENT)
Age: 89
End: 2024-04-07

## 2024-04-09 NOTE — PHYSICAL EXAM
[Normal] : supple, no neck mass and thyroid not enlarged [Normal Supraclavicular Lymph Nodes] : normal supraclavicular lymph nodes [Normal Axillary Lymph Nodes] : normal axillary lymph nodes [Normal] : oriented to person, place and time, with appropriate affect [de-identified] : fibrocystic changes and scar in right lumpectomy site but no masses

## 2024-04-09 NOTE — ASSESSMENT
[FreeTextEntry1] : IMP Right breast invasive ductal s/p lumpectomy 2012 - [place]  on Arimidex 1 mg daily  labs 9/2023 - WNL B/L mammo (no sono) 8/2023 - BIRADS 2  PLAN: labs today  Mamm/sono August 2024 RTO Q6 months with bloodwork

## 2024-04-09 NOTE — HISTORY OF PRESENT ILLNESS
[de-identified] : Alee Velasquez is a 89 year old patient presenting for a follow-up visit   HX; RIGHT breast cancer; s/p RIGHT lumpectomy with sentinel lymph node biopsy in 4/2012, path revealed T1N0 invasive ductal carcinoma, ER/AR+, HER2-; Oncotype Dx= 1, s/p radiation therapy.   Patient is currently on Arimidex 1 mg PO daily with no complaints.   A-fib and is on Xarelto.   labs 9/2023 - WNL B/L mammo (no sono) 8/2023 - BIRADS 2   Internist: Dr. Do.

## 2024-04-15 ENCOUNTER — APPOINTMENT (OUTPATIENT)
Dept: SURGICAL ONCOLOGY | Facility: CLINIC | Age: 89
End: 2024-04-15

## 2024-05-27 ENCOUNTER — RX RENEWAL (OUTPATIENT)
Age: 89
End: 2024-05-27

## 2024-05-27 RX ORDER — RIVAROXABAN 20 MG/1
20 TABLET, FILM COATED ORAL
Qty: 90 | Refills: 0 | Status: ACTIVE | COMMUNITY
Start: 2018-10-29 | End: 1900-01-01

## 2024-06-04 ENCOUNTER — RX RENEWAL (OUTPATIENT)
Age: 89
End: 2024-06-04

## 2024-06-04 RX ORDER — SOTALOL HYDROCHLORIDE 80 MG/1
80 TABLET ORAL
Qty: 180 | Refills: 0 | Status: ACTIVE | COMMUNITY
Start: 2018-10-29 | End: 1900-01-01

## 2024-06-27 ENCOUNTER — APPOINTMENT (OUTPATIENT)
Dept: CARDIOLOGY | Facility: CLINIC | Age: 89
End: 2024-06-27
Payer: MEDICARE

## 2024-06-27 ENCOUNTER — NON-APPOINTMENT (OUTPATIENT)
Age: 89
End: 2024-06-27

## 2024-06-27 VITALS
SYSTOLIC BLOOD PRESSURE: 107 MMHG | HEART RATE: 90 BPM | DIASTOLIC BLOOD PRESSURE: 72 MMHG | WEIGHT: 153 LBS | BODY MASS INDEX: 29.88 KG/M2 | OXYGEN SATURATION: 97 %

## 2024-06-27 DIAGNOSIS — I25.10 ATHEROSCLEROTIC HEART DISEASE OF NATIVE CORONARY ARTERY W/OUT ANGINA PECTORIS: ICD-10-CM

## 2024-06-27 DIAGNOSIS — E11.9 TYPE 2 DIABETES MELLITUS W/OUT COMPLICATIONS: ICD-10-CM

## 2024-06-27 DIAGNOSIS — I48.0 PAROXYSMAL ATRIAL FIBRILLATION: ICD-10-CM

## 2024-06-27 DIAGNOSIS — I34.0 NONRHEUMATIC MITRAL (VALVE) INSUFFICIENCY: ICD-10-CM

## 2024-06-27 PROCEDURE — 99215 OFFICE O/P EST HI 40 MIN: CPT

## 2024-06-27 PROCEDURE — 93000 ELECTROCARDIOGRAM COMPLETE: CPT

## 2024-06-27 PROCEDURE — G2211 COMPLEX E/M VISIT ADD ON: CPT

## 2024-06-28 LAB
ALBUMIN SERPL ELPH-MCNC: 4.3 G/DL
ALP BLD-CCNC: 72 U/L
ALT SERPL-CCNC: 8 U/L
ANION GAP SERPL CALC-SCNC: 15 MMOL/L
AST SERPL-CCNC: 15 U/L
BASOPHILS # BLD AUTO: 0.04 K/UL
BASOPHILS NFR BLD AUTO: 0.7 %
BILIRUB SERPL-MCNC: 0.4 MG/DL
BUN SERPL-MCNC: 29 MG/DL
CALCIUM SERPL-MCNC: 9.8 MG/DL
CHLORIDE SERPL-SCNC: 98 MMOL/L
CHOLEST SERPL-MCNC: 120 MG/DL
CO2 SERPL-SCNC: 20 MMOL/L
CREAT SERPL-MCNC: 0.79 MG/DL
EGFR: 70 ML/MIN/1.73M2
EOSINOPHIL # BLD AUTO: 0.01 K/UL
EOSINOPHIL NFR BLD AUTO: 0.2 %
ESTIMATED AVERAGE GLUCOSE: 126 MG/DL
GLUCOSE SERPL-MCNC: 109 MG/DL
HBA1C MFR BLD HPLC: 6 %
HCT VFR BLD CALC: 36.5 %
HDLC SERPL-MCNC: 52 MG/DL
HGB BLD-MCNC: 11.5 G/DL
IMM GRANULOCYTES NFR BLD AUTO: 0.2 %
LDLC SERPL CALC-MCNC: 56 MG/DL
LYMPHOCYTES # BLD AUTO: 1.57 K/UL
LYMPHOCYTES NFR BLD AUTO: 28.1 %
MAN DIFF?: NORMAL
MCHC RBC-ENTMCNC: 26.6 PG
MCHC RBC-ENTMCNC: 31.5 GM/DL
MCV RBC AUTO: 84.3 FL
MONOCYTES # BLD AUTO: 0.49 K/UL
MONOCYTES NFR BLD AUTO: 8.8 %
NEUTROPHILS # BLD AUTO: 3.46 K/UL
NEUTROPHILS NFR BLD AUTO: 62 %
NONHDLC SERPL-MCNC: 68 MG/DL
NT-PROBNP SERPL-MCNC: 1268 PG/ML
PLATELET # BLD AUTO: 244 K/UL
POTASSIUM SERPL-SCNC: 4.7 MMOL/L
PROT SERPL-MCNC: 6.7 G/DL
RBC # BLD: 4.33 M/UL
RBC # FLD: 15.9 %
SODIUM SERPL-SCNC: 133 MMOL/L
TRIGL SERPL-MCNC: 57 MG/DL
TSH SERPL-ACNC: 2.35 UIU/ML
VIT B12 SERPL-MCNC: 1189 PG/ML
WBC # FLD AUTO: 5.58 K/UL

## 2024-09-09 ENCOUNTER — RX RENEWAL (OUTPATIENT)
Age: 89
End: 2024-09-09

## 2024-09-10 ENCOUNTER — RX RENEWAL (OUTPATIENT)
Age: 89
End: 2024-09-10

## 2024-09-12 ENCOUNTER — APPOINTMENT (OUTPATIENT)
Dept: CARDIOLOGY | Facility: CLINIC | Age: 89
End: 2024-09-12

## 2024-09-30 ENCOUNTER — RX RENEWAL (OUTPATIENT)
Age: 89
End: 2024-09-30

## 2024-11-13 ENCOUNTER — NON-APPOINTMENT (OUTPATIENT)
Age: 89
End: 2024-11-13

## 2024-12-26 ENCOUNTER — APPOINTMENT (OUTPATIENT)
Dept: CARDIOLOGY | Facility: CLINIC | Age: 88
End: 2024-12-26